# Patient Record
Sex: MALE | Race: WHITE | Employment: FULL TIME | URBAN - METROPOLITAN AREA
[De-identification: names, ages, dates, MRNs, and addresses within clinical notes are randomized per-mention and may not be internally consistent; named-entity substitution may affect disease eponyms.]

---

## 2018-09-29 ENCOUNTER — APPOINTMENT (OUTPATIENT)
Dept: GENERAL RADIOLOGY | Age: 33
End: 2018-09-29
Payer: COMMERCIAL

## 2018-09-29 ENCOUNTER — HOSPITAL ENCOUNTER (EMERGENCY)
Age: 33
Discharge: HOME | End: 2018-09-29
Attending: EMERGENCY MEDICINE
Payer: COMMERCIAL

## 2018-09-29 VITALS
OXYGEN SATURATION: 97 % | TEMPERATURE: 98.3 F | SYSTOLIC BLOOD PRESSURE: 121 MMHG | HEART RATE: 70 BPM | DIASTOLIC BLOOD PRESSURE: 74 MMHG | HEIGHT: 67 IN | BODY MASS INDEX: 27.78 KG/M2 | WEIGHT: 177 LBS | RESPIRATION RATE: 18 BRPM

## 2018-09-29 DIAGNOSIS — S93.401A SPRAIN OF RIGHT ANKLE, UNSPECIFIED LIGAMENT, INITIAL ENCOUNTER: Primary | ICD-10-CM

## 2018-09-29 PROCEDURE — 73610 X-RAY EXAM OF ANKLE: CPT

## 2018-09-29 PROCEDURE — 96372 THER/PROPH/DIAG INJ SC/IM: CPT

## 2018-09-29 PROCEDURE — 99283 EMERGENCY DEPT VISIT LOW MDM: CPT

## 2018-09-29 PROCEDURE — 6360000002 HC RX W HCPCS: Performed by: EMERGENCY MEDICINE

## 2018-09-29 RX ORDER — KETOROLAC TROMETHAMINE 10 MG/1
10 TABLET, FILM COATED ORAL EVERY 6 HOURS PRN
Qty: 20 TABLET | Refills: 0 | Status: SHIPPED | OUTPATIENT
Start: 2018-09-29 | End: 2022-07-18

## 2018-09-29 RX ORDER — KETOROLAC TROMETHAMINE 30 MG/ML
60 INJECTION, SOLUTION INTRAMUSCULAR; INTRAVENOUS ONCE
Status: COMPLETED | OUTPATIENT
Start: 2018-09-29 | End: 2018-09-29

## 2018-09-29 RX ADMIN — KETOROLAC TROMETHAMINE 60 MG: 30 INJECTION, SOLUTION INTRAMUSCULAR at 16:27

## 2018-09-29 ASSESSMENT — PAIN DESCRIPTION - DESCRIPTORS: DESCRIPTORS: THROBBING

## 2018-09-29 ASSESSMENT — PAIN SCALES - GENERAL: PAINLEVEL_OUTOF10: 5

## 2018-09-29 ASSESSMENT — ENCOUNTER SYMPTOMS
WHEEZING: 0
SINUS PRESSURE: 0
COUGH: 0
ABDOMINAL DISTENTION: 0
ABDOMINAL PAIN: 0
COLOR CHANGE: 0
SHORTNESS OF BREATH: 0
DIARRHEA: 0
VOMITING: 0
APNEA: 0
PHOTOPHOBIA: 0
BACK PAIN: 0
RHINORRHEA: 0
NAUSEA: 0
EYE PAIN: 0
CONSTIPATION: 0
SORE THROAT: 0

## 2018-09-29 ASSESSMENT — PAIN DESCRIPTION - ORIENTATION: ORIENTATION: RIGHT

## 2018-09-29 ASSESSMENT — PAIN DESCRIPTION - LOCATION: LOCATION: ANKLE

## 2022-07-18 ENCOUNTER — HOSPITAL ENCOUNTER (OUTPATIENT)
Dept: GENERAL RADIOLOGY | Age: 37
Discharge: HOME | End: 2022-07-20
Payer: COMMERCIAL

## 2022-07-18 ENCOUNTER — OFFICE VISIT (OUTPATIENT)
Dept: FAMILY MEDICINE CLINIC | Age: 37
End: 2022-07-18
Payer: COMMERCIAL

## 2022-07-18 ENCOUNTER — HOSPITAL ENCOUNTER (OUTPATIENT)
Age: 37
Discharge: HOME | End: 2022-07-20
Payer: COMMERCIAL

## 2022-07-18 VITALS
DIASTOLIC BLOOD PRESSURE: 76 MMHG | TEMPERATURE: 98.7 F | HEART RATE: 82 BPM | HEIGHT: 67 IN | SYSTOLIC BLOOD PRESSURE: 120 MMHG | OXYGEN SATURATION: 97 % | BODY MASS INDEX: 28.72 KG/M2 | WEIGHT: 183 LBS

## 2022-07-18 DIAGNOSIS — R60.9 EDEMA, UNSPECIFIED TYPE: ICD-10-CM

## 2022-07-18 DIAGNOSIS — R06.02 SHORTNESS OF BREATH: ICD-10-CM

## 2022-07-18 DIAGNOSIS — R05.9 COUGH: ICD-10-CM

## 2022-07-18 DIAGNOSIS — R06.02 SHORTNESS OF BREATH: Primary | ICD-10-CM

## 2022-07-18 PROCEDURE — 99215 OFFICE O/P EST HI 40 MIN: CPT

## 2022-07-18 PROCEDURE — 93000 ELECTROCARDIOGRAM COMPLETE: CPT

## 2022-07-18 PROCEDURE — 71046 X-RAY EXAM CHEST 2 VIEWS: CPT

## 2022-07-18 SDOH — ECONOMIC STABILITY: FOOD INSECURITY: WITHIN THE PAST 12 MONTHS, YOU WORRIED THAT YOUR FOOD WOULD RUN OUT BEFORE YOU GOT MONEY TO BUY MORE.: NEVER TRUE

## 2022-07-18 SDOH — ECONOMIC STABILITY: FOOD INSECURITY: WITHIN THE PAST 12 MONTHS, THE FOOD YOU BOUGHT JUST DIDN'T LAST AND YOU DIDN'T HAVE MONEY TO GET MORE.: NEVER TRUE

## 2022-07-18 ASSESSMENT — ENCOUNTER SYMPTOMS
RHINORRHEA: 0
CHEST TIGHTNESS: 0
WHEEZING: 0
SHORTNESS OF BREATH: 1
NAUSEA: 0
COUGH: 1
SINUS PAIN: 0
EYES NEGATIVE: 1
ABDOMINAL PAIN: 0
SORE THROAT: 0
DIARRHEA: 0
SINUS PRESSURE: 0
VOMITING: 0

## 2022-07-18 ASSESSMENT — PATIENT HEALTH QUESTIONNAIRE - PHQ9
1. LITTLE INTEREST OR PLEASURE IN DOING THINGS: 0
SUM OF ALL RESPONSES TO PHQ QUESTIONS 1-9: 0
SUM OF ALL RESPONSES TO PHQ QUESTIONS 1-9: 0
2. FEELING DOWN, DEPRESSED OR HOPELESS: 0
SUM OF ALL RESPONSES TO PHQ QUESTIONS 1-9: 0
SUM OF ALL RESPONSES TO PHQ9 QUESTIONS 1 & 2: 0
SUM OF ALL RESPONSES TO PHQ QUESTIONS 1-9: 0

## 2022-07-18 ASSESSMENT — SOCIAL DETERMINANTS OF HEALTH (SDOH): HOW HARD IS IT FOR YOU TO PAY FOR THE VERY BASICS LIKE FOOD, HOUSING, MEDICAL CARE, AND HEATING?: NOT HARD AT ALL

## 2022-07-18 NOTE — PROGRESS NOTES
sounds. No stridor. No wheezing, rhonchi or rales. Chest:      Chest wall: No tenderness. Abdominal:      General: Abdomen is flat. Musculoskeletal:      Cervical back: No rigidity or tenderness. Right lower le+ Edema present. Left lower le+ Edema present. Lymphadenopathy:      Cervical: No cervical adenopathy. Skin:     General: Skin is warm and dry. Capillary Refill: Capillary refill takes less than 2 seconds. Coloration: Skin is not pale. Neurological:      Mental Status: He is alert and oriented to person, place, and time. Mental status is at baseline. Psychiatric:         Mood and Affect: Mood normal.         Behavior: Behavior normal.       VITALS  BP: 120/76, Temp: 98.7 °F (37.1 °C), Temp Source: Temporal, Heart Rate: 82,  , SpO2: 97 %      PAST MEDICAL HISTORY         Diagnosis Date    Heart murmur      SURGICAL HISTORY     Patient  has a past surgical history that includes Dental surgery. CURRENT MEDICATIONS       Previous Medications    IBUPROFEN PO    Take by mouth     ALLERGIES     Patient is has No Known Allergies. FAMILY HISTORY     Patient'sfamily history is not on file. HISTORY     Patient  reports that he has never smoked. He has been exposed to tobacco smoke. He has never used smokeless tobacco. He reports current alcohol use. He reports that he does not use drugs. READY CARE COURSE     Orders Placed This Encounter   Procedures    XR CHEST STANDARD (2 VW)     Standing Status:   Future     Standing Expiration Date:   2023     Order Specific Question:   Reason for exam:     Answer:   Cough greater than 2 weeks, dependent edema nonpitting    Urinalysis with Reflex to Culture     Standing Status:   Future     Standing Expiration Date:   2023     Order Specific Question:   SPECIFY(EX-CATH,MIDSTREAM,CYSTO,ETC)?      Answer:   midstream    CBC with Auto Differential     Standing Status:   Future     Standing Expiration Date:   2023 Comprehensive Metabolic Panel     Standing Status:   Future     Standing Expiration Date:   7/18/2023    EKG 12 lead     Order Specific Question:   Reason for Exam?     Answer:   Shortness of Breath    EKG 12 Lead - Clinic Performed     Order Specific Question:   Reason for Exam?     Answer:   Irregular heart rate        Labs:  No results found for this visit on 07/18/22. IMAGING:  XR CHEST STANDARD (2 VW)    (Results Pending)     Scheduled Meds:  Continuous Infusions:  PRN Meds:.

## 2022-07-18 NOTE — PATIENT INSTRUCTIONS
Have blood work performed tonight along with urinalysis and chest x-ray. I will call you with results when they are available. Is important to make an appointment with a PCP to follow-up for the lower extremity edema.

## 2022-07-19 ENCOUNTER — HOSPITAL ENCOUNTER (OUTPATIENT)
Dept: LAB | Age: 37
Discharge: HOME | End: 2022-07-19
Payer: COMMERCIAL

## 2022-07-19 DIAGNOSIS — R06.02 SHORTNESS OF BREATH: ICD-10-CM

## 2022-07-19 DIAGNOSIS — R05.9 COUGH: ICD-10-CM

## 2022-07-19 DIAGNOSIS — R60.9 EDEMA, UNSPECIFIED TYPE: ICD-10-CM

## 2022-07-19 LAB
ALBUMIN SERPL-MCNC: 3.9 G/DL (ref 3.5–4.6)
ALP BLD-CCNC: 77 U/L (ref 35–104)
ALT SERPL-CCNC: 16 U/L (ref 0–41)
ANION GAP SERPL CALCULATED.3IONS-SCNC: 13 MEQ/L (ref 9–15)
AST SERPL-CCNC: 13 U/L (ref 0–40)
BASOPHILS ABSOLUTE: 0.1 K/UL (ref 0–0.2)
BASOPHILS RELATIVE PERCENT: 0.9 %
BILIRUB SERPL-MCNC: 0.5 MG/DL (ref 0.2–0.7)
BILIRUBIN URINE: NEGATIVE
BLOOD, URINE: NEGATIVE
BUN BLDV-MCNC: 16 MG/DL (ref 6–20)
CALCIUM SERPL-MCNC: 9.6 MG/DL (ref 8.5–9.9)
CHLORIDE BLD-SCNC: 98 MEQ/L (ref 95–107)
CLARITY: CLEAR
CO2: 26 MEQ/L (ref 20–31)
COLOR: YELLOW
CREAT SERPL-MCNC: 1.26 MG/DL (ref 0.7–1.2)
EOSINOPHILS ABSOLUTE: 0.2 K/UL (ref 0–0.7)
EOSINOPHILS RELATIVE PERCENT: 3.3 %
GFR AFRICAN AMERICAN: >60
GFR NON-AFRICAN AMERICAN: >60
GLOBULIN: 3.1 G/DL (ref 2.3–3.5)
GLUCOSE BLD-MCNC: 122 MG/DL (ref 70–99)
GLUCOSE URINE: NEGATIVE MG/DL
HCT VFR BLD CALC: 42.8 % (ref 42–52)
HEMOGLOBIN: 14.2 G/DL (ref 14–18)
KETONES, URINE: NEGATIVE MG/DL
LEUKOCYTE ESTERASE, URINE: NEGATIVE
LYMPHOCYTES ABSOLUTE: 1 K/UL (ref 1–4.8)
LYMPHOCYTES RELATIVE PERCENT: 15.9 %
MCH RBC QN AUTO: 27.6 PG (ref 27–31.3)
MCHC RBC AUTO-ENTMCNC: 33.2 % (ref 33–37)
MCV RBC AUTO: 83.1 FL (ref 80–100)
MONOCYTES ABSOLUTE: 0.5 K/UL (ref 0.2–0.8)
MONOCYTES RELATIVE PERCENT: 8.3 %
NEUTROPHILS ABSOLUTE: 4.3 K/UL (ref 1.4–6.5)
NEUTROPHILS RELATIVE PERCENT: 71.6 %
NITRITE, URINE: NEGATIVE
PDW BLD-RTO: 13.3 % (ref 11.5–14.5)
PH UA: 5.5 (ref 5–9)
PLATELET # BLD: 279 K/UL (ref 130–400)
POTASSIUM SERPL-SCNC: 4.2 MEQ/L (ref 3.4–4.9)
PROTEIN UA: NEGATIVE MG/DL
RBC # BLD: 5.15 M/UL (ref 4.7–6.1)
SODIUM BLD-SCNC: 137 MEQ/L (ref 135–144)
SPECIFIC GRAVITY UA: 1.02 (ref 1–1.03)
TOTAL PROTEIN: 7 G/DL (ref 6.3–8)
URINE REFLEX TO CULTURE: NORMAL
UROBILINOGEN, URINE: 0.2 E.U./DL
WBC # BLD: 6.1 K/UL (ref 4.8–10.8)

## 2022-07-19 PROCEDURE — 36415 COLL VENOUS BLD VENIPUNCTURE: CPT

## 2022-07-19 PROCEDURE — 80053 COMPREHEN METABOLIC PANEL: CPT

## 2022-07-19 PROCEDURE — 81003 URINALYSIS AUTO W/O SCOPE: CPT

## 2022-07-19 PROCEDURE — 85025 COMPLETE CBC W/AUTO DIFF WBC: CPT

## 2022-07-28 ENCOUNTER — OFFICE VISIT (OUTPATIENT)
Dept: FAMILY MEDICINE CLINIC | Age: 37
End: 2022-07-28
Payer: COMMERCIAL

## 2022-07-28 VITALS
HEART RATE: 81 BPM | WEIGHT: 178 LBS | HEIGHT: 67 IN | OXYGEN SATURATION: 98 % | SYSTOLIC BLOOD PRESSURE: 114 MMHG | BODY MASS INDEX: 27.94 KG/M2 | DIASTOLIC BLOOD PRESSURE: 78 MMHG | TEMPERATURE: 97.5 F

## 2022-07-28 DIAGNOSIS — R06.2 WHEEZING: ICD-10-CM

## 2022-07-28 DIAGNOSIS — R53.83 FATIGUE, UNSPECIFIED TYPE: ICD-10-CM

## 2022-07-28 DIAGNOSIS — N28.9 MILD RENAL INSUFFICIENCY: ICD-10-CM

## 2022-07-28 DIAGNOSIS — R73.9 HYPERGLYCEMIA: ICD-10-CM

## 2022-07-28 DIAGNOSIS — R05.9 COUGH: Primary | ICD-10-CM

## 2022-07-28 LAB
Lab: NORMAL
PERFORMING INSTRUMENT: NORMAL
QC PASS/FAIL: NORMAL
SARS-COV-2, POC: NORMAL

## 2022-07-28 PROCEDURE — 87426 SARSCOV CORONAVIRUS AG IA: CPT | Performed by: FAMILY MEDICINE

## 2022-07-28 PROCEDURE — 99204 OFFICE O/P NEW MOD 45 MIN: CPT | Performed by: FAMILY MEDICINE

## 2022-07-28 RX ORDER — ALBUTEROL SULFATE 90 UG/1
2 AEROSOL, METERED RESPIRATORY (INHALATION) EVERY 6 HOURS PRN
Qty: 18 G | Refills: 0 | Status: SHIPPED | OUTPATIENT
Start: 2022-07-28

## 2022-07-28 RX ORDER — ACETAMINOPHEN 500 MG
500 TABLET ORAL EVERY 6 HOURS PRN
COMMUNITY

## 2022-07-28 RX ORDER — PREDNISONE 10 MG/1
TABLET ORAL
Qty: 45 TABLET | Refills: 0 | Status: SHIPPED | OUTPATIENT
Start: 2022-07-28

## 2022-07-28 ASSESSMENT — ENCOUNTER SYMPTOMS
WHEEZING: 1
NAUSEA: 0
SORE THROAT: 0
SINUS PAIN: 0
RHINORRHEA: 0
CHEST TIGHTNESS: 1
COUGH: 1
VOMITING: 0
DIARRHEA: 0
ABDOMINAL PAIN: 0
SHORTNESS OF BREATH: 1
SINUS PRESSURE: 0

## 2022-07-28 ASSESSMENT — PATIENT HEALTH QUESTIONNAIRE - PHQ9
SUM OF ALL RESPONSES TO PHQ9 QUESTIONS 1 & 2: 0
SUM OF ALL RESPONSES TO PHQ QUESTIONS 1-9: 0
2. FEELING DOWN, DEPRESSED OR HOPELESS: 0
SUM OF ALL RESPONSES TO PHQ QUESTIONS 1-9: 0
1. LITTLE INTEREST OR PLEASURE IN DOING THINGS: 0
SUM OF ALL RESPONSES TO PHQ QUESTIONS 1-9: 0
SUM OF ALL RESPONSES TO PHQ QUESTIONS 1-9: 0

## 2022-07-28 NOTE — PROGRESS NOTES
Kevan Carr (: 1985) is a 40 y.o. male, New patient, who presents today for:    Chief Complaint   Patient presents with    New Patient     Patient is present to [facility] care. Cough     Patient states that he has had a bad cough and SOB Patient states that this has been going on for a few weeks          ASSESSMENT/PLAN    1. Cough  Comments:  Likely related to hyperreactive airway. Will treat empirically with a tapered course of prednisone and albuterol Q6H scheduled x3 days and Q6H PRN afterward. Orders:  -     POCT COVID-19, Antigen  -     predniSONE (DELTASONE) 10 MG tablet; Take 5 tabs daily x 3 days, 4 tabs daily x 3 days, 3 tabs daily x 3 days, 2 tabs daily x 3 days, 1 tab daily x 3 days, Disp-45 tablet, R-0Normal  -     albuterol sulfate HFA (PROVENTIL HFA) 108 (90 Base) MCG/ACT inhaler; Inhale 2 puffs into the lungs every 6 hours as needed for Wheezing or Shortness of Breath, Disp-18 g, R-0Normal  2. Wheezing  Comments:  See above. If symptoms persist, or improve and then worsen following treatment, the next step would be obtaining PFTs and referring to pulmonology. Orders:  -     predniSONE (DELTASONE) 10 MG tablet; Take 5 tabs daily x 3 days, 4 tabs daily x 3 days, 3 tabs daily x 3 days, 2 tabs daily x 3 days, 1 tab daily x 3 days, Disp-45 tablet, R-0Normal  -     albuterol sulfate HFA (PROVENTIL HFA) 108 (90 Base) MCG/ACT inhaler; Inhale 2 puffs into the lungs every 6 hours as needed for Wheezing or Shortness of Breath, Disp-18 g, R-0Normal  3. Fatigue, unspecified type  Comments:  Likely related to airway issues, will obtain labs to further evaluate. Orders:  -     Basic Metabolic Panel; Future  -     TSH with Reflex; Future  -     Vitamin D 25 Hydroxy; Future  4. Mild renal insufficiency  -     Basic Metabolic Panel; Future  5. Hyperglycemia  -     Basic Metabolic Panel; Future  -     Hemoglobin A1C; Future      Return for F/U wheezing and cough in 2-3 weeks. SUBJECTIVE/OBJECTIVE:    HPI    Patient presents to establish care and have walk-in clinic follow-up visit. Patient was seen at [facility] clinic on 07/18/2022 with complaints of nonproductive cough, lower extremity edema, dyspnea on exertion, and fatigue x2 weeks. Patient was noted to have 1+ bilateral ankle edema. An EKG was reported to show sinus rhythm with a rate of 85. Orders for CBC, CMP, chest x-ray and urinalysis were given. Patient was instructed to follow-up with primary care as an outpatient. Patient reports feeling improved overall, however, there is reported continued cough coming in fits that is non-productive. There is no dyspnea at rest, but mild dyspnea on exertion. There is reported intermittent wheezing with difficulty taking deep breaths. Patient denies any palpitations, lightheadedness, dizziness, or syncope. The previously reported lower extremity edema has for the most part resolved. Patient denies any cigarette use. He denies any history of seasonal allergies or past history of asthma. Current Outpatient Medications on File Prior to Visit   Medication Sig Dispense Refill    acetaminophen (TYLENOL) 500 MG tablet Take 500 mg by mouth every 6 hours as needed for Pain      IBUPROFEN PO Take by mouth       No current facility-administered medications on file prior to visit. No Known Allergies     Review of Systems   Constitutional:  Positive for fatigue. Negative for appetite change, chills, diaphoresis and fever. HENT:  Negative for congestion, ear pain, postnasal drip, rhinorrhea, sinus pressure, sinus pain and sore throat. No anosmia   Eyes:  Negative for visual disturbance. Respiratory:  Positive for cough, chest tightness, shortness of breath and wheezing. Cardiovascular:  Negative for chest pain, palpitations and leg swelling (resolved). Gastrointestinal:  Negative for abdominal pain, diarrhea, nausea and vomiting.    Musculoskeletal:  Negative for myalgias. Skin:  Negative for rash. Neurological:  Negative for dizziness, syncope, light-headedness and headaches. Hematological:  Negative for adenopathy. Vitals:  /78 (Site: Left Upper Arm, Position: Sitting, Cuff Size: Medium Adult)   Pulse 81   Temp 97.5 °F (36.4 °C) (Temporal)   Ht 5' 7\" (1.702 m)   Wt 178 lb (80.7 kg)   SpO2 98%   BMI 27.88 kg/m²      Internal Administration   First Dose      Second Dose           Last COVID Lab SARS-COV-2, POC (no units)   Date Value   07/28/2022 Not-Detected           Physical Exam  Vitals reviewed. Constitutional:       General: He is not in acute distress. Appearance: He is not ill-appearing or diaphoretic. HENT:      Head: Normocephalic and atraumatic. Salivary Glands: Right salivary gland is not diffusely enlarged or tender. Left salivary gland is not diffusely enlarged or tender. Right Ear: External ear normal.      Left Ear: External ear normal.      Nose: Nose normal. No congestion. Right Sinus: No maxillary sinus tenderness or frontal sinus tenderness. Left Sinus: No maxillary sinus tenderness or frontal sinus tenderness. Eyes:      General: No scleral icterus. Right eye: No discharge. Left eye: No discharge. Conjunctiva/sclera: Conjunctivae normal.      Pupils: Pupils are equal, round, and reactive to light. Neck:      Thyroid: No thyroid mass, thyromegaly or thyroid tenderness. Vascular: No carotid bruit. Cardiovascular:      Rate and Rhythm: Normal rate and regular rhythm. Heart sounds: Normal heart sounds. No murmur heard. Pulmonary:      Effort: Pulmonary effort is normal. No tachypnea, accessory muscle usage or respiratory distress. Breath sounds: No decreased air movement. Wheezing (scattered end-expiratory wheezing) present. No decreased breath sounds, rhonchi or rales. Abdominal:      General: Bowel sounds are normal. There is no distension.       Palpations: Abdomen is soft. Tenderness: There is no abdominal tenderness. There is no right CVA tenderness, left CVA tenderness, guarding or rebound. Musculoskeletal:      Cervical back: Neck supple. Right lower leg: No edema. Left lower leg: No edema. Lymphadenopathy:      Cervical: No cervical adenopathy. Skin:     Findings: No rash. Neurological:      Mental Status: He is alert and oriented to person, place, and time. Psychiatric:         Mood and Affect: Mood normal.         Behavior: Behavior normal.         Thought Content: Thought content normal.       Ortho Exam (If Applicable)              An electronic signature was used to authenticate this note.      Desmond Rasmussen MD

## 2022-08-09 ENCOUNTER — HOSPITAL ENCOUNTER (OUTPATIENT)
Dept: LAB | Age: 37
Discharge: HOME | End: 2022-08-09
Payer: COMMERCIAL

## 2022-08-09 DIAGNOSIS — N28.9 MILD RENAL INSUFFICIENCY: ICD-10-CM

## 2022-08-09 DIAGNOSIS — R53.83 FATIGUE, UNSPECIFIED TYPE: ICD-10-CM

## 2022-08-09 DIAGNOSIS — R73.9 HYPERGLYCEMIA: ICD-10-CM

## 2022-08-09 LAB
ANION GAP SERPL CALCULATED.3IONS-SCNC: 15 MEQ/L (ref 9–15)
BUN BLDV-MCNC: 18 MG/DL (ref 6–20)
CALCIUM SERPL-MCNC: 9.3 MG/DL (ref 8.5–9.9)
CHLORIDE BLD-SCNC: 97 MEQ/L (ref 95–107)
CO2: 26 MEQ/L (ref 20–31)
CREAT SERPL-MCNC: 1.05 MG/DL (ref 0.7–1.2)
GFR AFRICAN AMERICAN: >60
GFR NON-AFRICAN AMERICAN: >60
GLUCOSE BLD-MCNC: 129 MG/DL (ref 70–99)
HBA1C MFR BLD: 5.8 % (ref 4.8–5.9)
POTASSIUM SERPL-SCNC: 4.2 MEQ/L (ref 3.4–4.9)
SODIUM BLD-SCNC: 138 MEQ/L (ref 135–144)
TSH REFLEX: 0.89 UIU/ML (ref 0.44–3.86)
VITAMIN D 25-HYDROXY: 25.6 NG/ML

## 2022-08-09 PROCEDURE — 83036 HEMOGLOBIN GLYCOSYLATED A1C: CPT

## 2022-08-09 PROCEDURE — 80048 BASIC METABOLIC PNL TOTAL CA: CPT

## 2022-08-09 PROCEDURE — 82306 VITAMIN D 25 HYDROXY: CPT

## 2022-08-09 PROCEDURE — 36415 COLL VENOUS BLD VENIPUNCTURE: CPT

## 2022-08-09 PROCEDURE — 84443 ASSAY THYROID STIM HORMONE: CPT

## 2022-08-17 DIAGNOSIS — E55.9 VITAMIN D DEFICIENCY: Primary | ICD-10-CM

## 2022-08-17 DIAGNOSIS — R73.03 PRE-DIABETES: ICD-10-CM

## 2022-08-17 RX ORDER — ACETAMINOPHEN 160 MG
1 TABLET,DISINTEGRATING ORAL DAILY
Qty: 90 CAPSULE | Refills: 1 | Status: SHIPPED | OUTPATIENT
Start: 2022-08-17

## 2022-08-17 NOTE — RESULT ENCOUNTER NOTE
Low vitamin D 25.6, A1c 5.8, BMP with  normal TSH 0.889  MyChart message sent to patient, Rx sent to pharmacy

## 2022-08-19 ENCOUNTER — HOSPITAL ENCOUNTER (OUTPATIENT)
Age: 37
Discharge: HOME | End: 2022-08-21
Payer: COMMERCIAL

## 2022-08-19 ENCOUNTER — OFFICE VISIT (OUTPATIENT)
Dept: FAMILY MEDICINE CLINIC | Age: 37
End: 2022-08-19
Payer: COMMERCIAL

## 2022-08-19 ENCOUNTER — HOSPITAL ENCOUNTER (OUTPATIENT)
Dept: GENERAL RADIOLOGY | Age: 37
Discharge: HOME | End: 2022-08-21
Payer: COMMERCIAL

## 2022-08-19 VITALS
DIASTOLIC BLOOD PRESSURE: 80 MMHG | HEART RATE: 88 BPM | SYSTOLIC BLOOD PRESSURE: 108 MMHG | WEIGHT: 176.6 LBS | OXYGEN SATURATION: 97 % | TEMPERATURE: 97.4 F | HEIGHT: 67 IN | BODY MASS INDEX: 27.72 KG/M2

## 2022-08-19 DIAGNOSIS — R06.2 WHEEZING: ICD-10-CM

## 2022-08-19 DIAGNOSIS — R05.9 COUGH: Primary | ICD-10-CM

## 2022-08-19 DIAGNOSIS — R05.9 COUGH: ICD-10-CM

## 2022-08-19 PROCEDURE — 99213 OFFICE O/P EST LOW 20 MIN: CPT | Performed by: FAMILY MEDICINE

## 2022-08-19 PROCEDURE — 71046 X-RAY EXAM CHEST 2 VIEWS: CPT

## 2022-08-19 ASSESSMENT — PATIENT HEALTH QUESTIONNAIRE - PHQ9
SUM OF ALL RESPONSES TO PHQ9 QUESTIONS 1 & 2: 0
SUM OF ALL RESPONSES TO PHQ QUESTIONS 1-9: 0
2. FEELING DOWN, DEPRESSED OR HOPELESS: 0
1. LITTLE INTEREST OR PLEASURE IN DOING THINGS: 0

## 2022-08-19 ASSESSMENT — ENCOUNTER SYMPTOMS
DIARRHEA: 0
NAUSEA: 0
VOMITING: 0
ABDOMINAL PAIN: 0
WHEEZING: 1
SHORTNESS OF BREATH: 0
COUGH: 1
CHEST TIGHTNESS: 1

## 2022-08-19 NOTE — PROGRESS NOTES
Stephen Clement (: 1985) is a 40 y.o. male, Established patient, who presents today for:    Chief Complaint   Patient presents with    Follow-up     Patient is present for f/u from visit on 2022. Patient states that he is still having the cough and wheezing since he finished the prednisone. ASSESSMENT/PLAN    1. Cough  Comments:  Continued despite recent course of prednisone. Will obtain CXR, PFTs, and refer to pulm. Patient to continue PRN albuterol  Orders:  -     XR CHEST STANDARD (2 VW); Future  -     Full PFT Study With Bronchodilator; Future  -     Baron Mckeon MD, PulmonologyArnaud  2. Wheezing  Comments:  Managed with PRN albuterol. See above. Orders:  -     XR CHEST STANDARD (2 VW); Future  -     Full PFT Study With Bronchodilator; Future  -     Baron Mckeon MD, PulmonologyJeffrey    Return in about 4 months (around 2022) for Chronic Disease Check. SUBJECTIVE/OBJECTIVE:    HPI    Patient presents for follow-up visit regarding cough and wheezing. They report finishing full tapered course of prednisone since most recent visit. Reported persistent cough and reported intermittent wheezing despite finishing full course of medication for treatment. Cough and wheezing were improved with use of prednisone, but worsened again following treatment. Cough is non-productive, but intermittent throughout the day, particularly with activity/exercise and is noted to worse early in AM and later at night. There is no reported dyspnea or chest pain. The wheezing is associated with chest tightness that is improved with use of albuterol. Patient denies any fever/chills/sweats, nasal congestion, postnasal drainage, sinus pain, ear pain, sore throat, headache, abdominal pain, nausea/vomiting, or diarrhea.     Current Outpatient Medications on File Prior to Visit   Medication Sig Dispense Refill    Cholecalciferol (VITAMIN D3) 50 MCG ( UT) CAPS Take 1 capsule by mouth daily 90 capsule 1    acetaminophen (TYLENOL) 500 MG tablet Take 500 mg by mouth every 6 hours as needed for Pain      predniSONE (DELTASONE) 10 MG tablet Take 5 tabs daily x 3 days, 4 tabs daily x 3 days, 3 tabs daily x 3 days, 2 tabs daily x 3 days, 1 tab daily x 3 days 45 tablet 0    albuterol sulfate HFA (PROVENTIL HFA) 108 (90 Base) MCG/ACT inhaler Inhale 2 puffs into the lungs every 6 hours as needed for Wheezing or Shortness of Breath 18 g 0    IBUPROFEN PO Take by mouth       No current facility-administered medications on file prior to visit. No Known Allergies     Review of Systems   Constitutional:  Negative for appetite change, chills, diaphoresis, fatigue and fever. Respiratory:  Positive for cough, chest tightness and wheezing. Negative for shortness of breath. Cardiovascular:  Negative for chest pain, palpitations and leg swelling. Gastrointestinal:  Negative for abdominal pain, diarrhea, nausea and vomiting. Neurological:  Negative for dizziness, syncope and light-headedness. Vitals:  /80 (Site: Left Upper Arm, Position: Sitting, Cuff Size: Medium Adult)   Pulse 88   Temp 97.4 °F (36.3 °C) (Temporal)   Ht 5' 7\" (1.702 m)   Wt 176 lb 9.6 oz (80.1 kg)   SpO2 97%   BMI 27.66 kg/m²     Physical Exam  Vitals reviewed. Constitutional:       General: He is not in acute distress. Appearance: Normal appearance. Cardiovascular:      Rate and Rhythm: Normal rate and regular rhythm. Heart sounds: No murmur heard. Pulmonary:      Effort: Pulmonary effort is normal. No tachypnea, accessory muscle usage or respiratory distress. Breath sounds: Normal breath sounds. No decreased air movement. No decreased breath sounds, wheezing, rhonchi or rales. Abdominal:      General: Bowel sounds are normal.      Palpations: Abdomen is soft. Tenderness: There is no abdominal tenderness. There is no guarding or rebound. Musculoskeletal:      Right lower leg: No edema. Left lower leg: No edema. Skin:     Findings: No rash. Neurological:      Mental Status: He is alert and oriented to person, place, and time. Psychiatric:         Mood and Affect: Mood normal.         Behavior: Behavior normal.         Thought Content: Thought content normal.       Ortho Exam (If Applicable)              An electronic signature was used to authenticate this note.      Anita Gottron, MD

## 2022-08-25 ENCOUNTER — HOSPITAL ENCOUNTER (OUTPATIENT)
Dept: PULMONOLOGY | Age: 37
Discharge: HOME | End: 2022-08-25
Payer: COMMERCIAL

## 2022-08-25 DIAGNOSIS — R06.2 WHEEZING: ICD-10-CM

## 2022-08-25 DIAGNOSIS — R05.9 COUGH: ICD-10-CM

## 2022-08-25 PROCEDURE — 6360000002 HC RX W HCPCS: Performed by: FAMILY MEDICINE

## 2022-08-25 PROCEDURE — 94729 DIFFUSING CAPACITY: CPT

## 2022-08-25 PROCEDURE — 94726 PLETHYSMOGRAPHY LUNG VOLUMES: CPT

## 2022-08-25 PROCEDURE — 94060 EVALUATION OF WHEEZING: CPT

## 2022-08-25 RX ORDER — ALBUTEROL SULFATE 2.5 MG/3ML
2.5 SOLUTION RESPIRATORY (INHALATION) ONCE
Status: COMPLETED | OUTPATIENT
Start: 2022-08-25 | End: 2022-08-25

## 2022-08-25 RX ADMIN — ALBUTEROL SULFATE 2.5 MG: 2.5 SOLUTION RESPIRATORY (INHALATION) at 08:26

## 2022-08-26 ENCOUNTER — OFFICE VISIT (OUTPATIENT)
Dept: PULMONOLOGY | Age: 37
End: 2022-08-26
Payer: COMMERCIAL

## 2022-08-26 VITALS
TEMPERATURE: 97.6 F | SYSTOLIC BLOOD PRESSURE: 111 MMHG | OXYGEN SATURATION: 98 % | HEIGHT: 67 IN | DIASTOLIC BLOOD PRESSURE: 66 MMHG | WEIGHT: 176 LBS | HEART RATE: 77 BPM | BODY MASS INDEX: 27.62 KG/M2

## 2022-08-26 DIAGNOSIS — R05.9 COUGH: Primary | ICD-10-CM

## 2022-08-26 PROCEDURE — 99204 OFFICE O/P NEW MOD 45 MIN: CPT | Performed by: INTERNAL MEDICINE

## 2022-08-26 RX ORDER — PANTOPRAZOLE SODIUM 40 MG/1
40 TABLET, DELAYED RELEASE ORAL
Qty: 30 TABLET | Refills: 0 | Status: SHIPPED | OUTPATIENT
Start: 2022-08-26 | End: 2022-09-23 | Stop reason: SDUPTHER

## 2022-08-26 RX ORDER — BENZONATATE 200 MG/1
200 CAPSULE ORAL 3 TIMES DAILY PRN
Qty: 60 CAPSULE | Refills: 0 | Status: SHIPPED | OUTPATIENT
Start: 2022-08-26 | End: 2022-09-15

## 2022-08-26 ASSESSMENT — ENCOUNTER SYMPTOMS
DIARRHEA: 0
EYE ITCHING: 0
NAUSEA: 0
ABDOMINAL PAIN: 0
SHORTNESS OF BREATH: 1
CHEST TIGHTNESS: 0
VOMITING: 0
VOICE CHANGE: 0
COUGH: 1
SORE THROAT: 0
WHEEZING: 1
RHINORRHEA: 0

## 2022-08-26 NOTE — PROGRESS NOTES
Subjective:     Jeannine Hurley is a 40 y.o. male who complains today of:     Chief Complaint   Patient presents with    New Patient     Ref. - Diana, Cough/Wheezing       HPI     He had covid  jan 2022. He is having cough for last 6 weeks. He said he said he was seen by dr. Dion Gordon. He continues to have a cough without significant improvement. He said he wakes up with cough and it is dry. He said he has no hearburn but he has cough when he is full after eating. He was given prednisone for 2 weeks it helped but but never went away. C/o shortness of breath  heavy exertion and fast walk. Off and on  Wheezing. 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 No Hemoptysis. No Chest tightness unless he cough a lot . No Chest pain with radiation or pleuritic pain. C/o left ankle edema few month   No orthopnea. No Fever or chills. No Rhinorrhea and postnasal drip. He is using bronchodilator with albuterol prn     CXR no active disease      Allergies:  Patient has no known allergies. Past Medical History:   Diagnosis Date    Heart murmur      Past Surgical History:   Procedure Laterality Date    DENTAL SURGERY       History reviewed. No pertinent family history.   Social History     Socioeconomic History    Marital status:      Spouse name: Not on file    Number of children: Not on file    Years of education: Not on file    Highest education level: Not on file   Occupational History    Not on file   Tobacco Use    Smoking status: Never     Passive exposure: Current    Smokeless tobacco: Never    Tobacco comments:     Wife smokes   Vaping Use    Vaping Use: Never used   Substance and Sexual Activity    Alcohol use: Yes     Comment: occasional    Drug use: No    Sexual activity: Yes     Partners: Female   Other Topics Concern    Not on file   Social History Narrative    Not on file     Social Determinants of Health     Financial Resource Strain: Low Risk Difficulty of Paying Living Expenses: Not hard at all   Food Insecurity: No Food Insecurity    Worried About Running Out of Food in the Last Year: Never true    Ran Out of Food in the Last Year: Never true   Transportation Needs: Not on file   Physical Activity: Not on file   Stress: Not on file   Social Connections: Not on file   Intimate Partner Violence: Not on file   Housing Stability: Not on file         Review of Systems   Constitutional:  Negative for chills, diaphoresis, fatigue and fever. HENT:  Negative for congestion, mouth sores, nosebleeds, postnasal drip, rhinorrhea, sneezing, sore throat and voice change. Eyes:  Negative for itching and visual disturbance. Respiratory:  Positive for cough, shortness of breath and wheezing. Negative for chest tightness. Cardiovascular: Negative. Negative for chest pain, palpitations and leg swelling. Gastrointestinal:  Negative for abdominal pain, diarrhea, nausea and vomiting. Genitourinary:  Negative for difficulty urinating and hematuria. Musculoskeletal:  Negative for arthralgias, joint swelling and myalgias. Skin:  Negative for rash. Allergic/Immunologic: Negative for environmental allergies. Neurological:  Negative for dizziness, tremors, weakness and headaches. Psychiatric/Behavioral:  Negative for behavioral problems and sleep disturbance.        :     Vitals:    08/26/22 1022   BP: 111/66   Site: Right Upper Arm   Position: Sitting   Cuff Size: Medium Adult   Pulse: 77   Temp: 97.6 °F (36.4 °C)   TempSrc: Temporal   SpO2: 98%   Weight: 176 lb (79.8 kg)   Height: 5' 7\" (1.702 m)     Wt Readings from Last 3 Encounters:   08/26/22 176 lb (79.8 kg)   08/19/22 176 lb 9.6 oz (80.1 kg)   07/28/22 178 lb (80.7 kg)         Physical Exam  Constitutional:       Appearance: He is well-developed. HENT:      Head: Normocephalic and atraumatic.       Nose: Nose normal.   Eyes:      Conjunctiva/sclera: Conjunctivae normal.      Pupils: Pupils are equal, round, and reactive to light. Neck:      Thyroid: No thyromegaly. Vascular: No JVD. Trachea: No tracheal deviation. Cardiovascular:      Rate and Rhythm: Normal rate and regular rhythm. Heart sounds: No murmur heard. No friction rub. No gallop. Pulmonary:      Effort: Pulmonary effort is normal. No respiratory distress. Breath sounds: Normal breath sounds. No wheezing or rales. Chest:      Chest wall: No tenderness. Abdominal:      General: There is no distension. Musculoskeletal:         General: Normal range of motion. Comments: Left ankle swelling   Lymphadenopathy:      Cervical: No cervical adenopathy. Skin:     General: Skin is warm and dry. Findings: No rash. Neurological:      Mental Status: He is alert and oriented to person, place, and time. Cranial Nerves: No cranial nerve deficit.    Psychiatric:         Behavior: Behavior normal.       Current Outpatient Medications   Medication Sig Dispense Refill    benzonatate (TESSALON) 200 MG capsule Take 1 capsule by mouth 3 times daily as needed for Cough 60 capsule 0    pantoprazole (PROTONIX) 40 MG tablet Take 1 tablet by mouth every morning (before breakfast) 30 tablet 0    mometasone-formoterol (DULERA) 100-5 MCG/ACT inhaler Inhale 2 puffs into the lungs 2 times daily 1 each 0    Cholecalciferol (VITAMIN D3) 50 MCG (2000 UT) CAPS Take 1 capsule by mouth daily 90 capsule 1    acetaminophen (TYLENOL) 500 MG tablet Take 500 mg by mouth every 6 hours as needed for Pain      albuterol sulfate HFA (PROVENTIL HFA) 108 (90 Base) MCG/ACT inhaler Inhale 2 puffs into the lungs every 6 hours as needed for Wheezing or Shortness of Breath 18 g 0    predniSONE (DELTASONE) 10 MG tablet Take 5 tabs daily x 3 days, 4 tabs daily x 3 days, 3 tabs daily x 3 days, 2 tabs daily x 3 days, 1 tab daily x 3 days (Patient not taking: Reported on 8/26/2022) 45 tablet 0    IBUPROFEN PO Take by mouth (Patient not taking: Reported on 8/26/2022)       No current facility-administered medications for this visit. Results for orders placed during the hospital encounter of 08/19/22    XR CHEST (2 VW)    Narrative  Exam: XR CHEST (2 VW)    CLINICAL HISTORY: R05.9 Cough ICD10    COMPARISON: None    CHEST X-ray, 2 VIEWS    FINDINGS:    The cardiomediastinal silhouette is within normal limits. There are no infiltrates, consolidations or effusions. .  Bones of the thorax appear intact. Impression  No radiographic evidence of acute intrathoracic process. Results for orders placed during the hospital encounter of 07/18/22    XR CHEST (2 VW)    Narrative  DIAGNOSIS:R06.02 Shortness of breath ICD10    COMPARISON: March 9, 2015    TECHNIQUE: PA and lateral chest    FINDINGS: The lungs contain no focal infiltrate, pleural effusion, consolidation or pneumothorax. The cardiac silhouette is not enlarged. The osseous structures are grossly intact. Impression  No acute cardiopulmonary process      Results for orders placed during the hospital encounter of 03/09/15    XR Chest Standard TWO VW    Narrative  EXAMINATION CHEST, TWO VIEWS    CLINICAL INFORMATION COUGH. FINDINGS Cardiomediastinal silhouette and pulmonary vascularity are  normal. There are no definite infiltrates or effusions. There is no  evidence of adenopathy. IMPRESSION    NEGATIVE CHEST. Brooklyn Dowling M.D. Released By- Lizzy Park M.D. Released Date Time- 03/10/15 1733  This document has been electronically signed. ------------------------------------------------------------------------------    Assessment/Plan:     1. Cough  He had covid  jan 2022. h cough started about 6 weeks ago. He is having cough for last 6 weeks. He said he wakes up with cough and it is dry. He said he has no hearburn but he has coughing spell when he is full after eating ability of GERD.   He was given prednisone for 2 weeks it helped but but never went away possibility of GERD, asked give him a trial of Protonix 40 mg daily for few weeks. .  Considering c/o shortness of breath  heavy exertion and fast walk. Off and on  Wheezing. 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 Chest x-ray showed no active disease. Started on Dulera 100 -5 mcg 2 puff twice daily. He is using bronchodilator with albuterol prn. PFT on 8/25/2022 not showing obstructive disease.    - benzonatate (TESSALON) 200 MG capsule; Take 1 capsule by mouth 3 times daily as needed for Cough  Dispense: 60 capsule; Refill: 0  - pantoprazole (PROTONIX) 40 MG tablet; Take 1 tablet by mouth every morning (before breakfast)  Dispense: 30 tablet; Refill: 0  - mometasone-formoterol (DULERA) 100-5 MCG/ACT inhaler; Inhale 2 puffs into the lungs 2 times daily  Dispense: 1 each; Refill: 0      Return in about 4 weeks (around 9/23/2022) for cough.       Amara Hood MD

## 2022-08-27 NOTE — PROCEDURES
[facility] [address] [place] [address], [address] [facility]                               PULMONARY FUNCTION    PATIENT NAME: Devorah Paulson                        :        1985  MED REC NO:   [id number]                            ROOM:  ACCOUNT NO:   [de-identified]                           ADMIT DATE: 2022  PROVIDER:     Charli Espinoza MD    DATE OF PROCEDURE:  2022    REQUESTING PROVIDER:  Chelsea Iqbal. Dwight Grullon MD.    INTERPRETING PROVIDER:  Arash Gayle MD.    REASON FOR STUDY:  Shortness of breath, cough, and wheezing. INTERPRETATION:  FVC is 3.63, 74% of predicted; FEV1 is 3.00, 75% of  predicted; FEV1/FVC is 82%; and FEF 25-75% is 3.17, 81% of predicted. Postbronchodilator study shows no significant improvement. Lung volume  study shows residual volume is 1.47, 92% of predicted. TLC is 5.18, 82%  of predicted. RV to TLC ratio is 28.3, 712% of predicted. Diffusion  capacity is 22.61, 69% of predicted. Corrected for alveolar volume is  5.23, 101% of predicted. Airway resistance is 1.32, 91% of predicted. SUMMARY:  FVC/FEV1 mildly decreased with normal ratio and midflow  suggest there is possibility of mild restriction, but total lung  capacity is within normal range. Slow vital capacity is mildly  decreased. There is no significant response to bronchodilator. Diffusion capacity is mildly impaired, which is normal when corrected to  alveolar volume. Airway resistance is within normal range. Clinical  correlation is requested.         Stan Suarez MD    D: 2022 9:40:12       T: 2022 10:56:31     [id number]  Job#: [id number]     Doc#: [id number]    CC:

## 2022-08-28 PROBLEM — R05.9 COUGH: Status: ACTIVE | Noted: 2022-08-28

## 2022-08-28 PROCEDURE — 94729 DIFFUSING CAPACITY: CPT | Performed by: INTERNAL MEDICINE

## 2022-08-28 PROCEDURE — 94060 EVALUATION OF WHEEZING: CPT | Performed by: INTERNAL MEDICINE

## 2022-08-28 PROCEDURE — 94726 PLETHYSMOGRAPHY LUNG VOLUMES: CPT | Performed by: INTERNAL MEDICINE

## 2022-08-29 DIAGNOSIS — R94.2 RESTRICTIVE VENTILATORY DEFECT: Primary | ICD-10-CM

## 2022-08-29 DIAGNOSIS — R05.9 COUGH: ICD-10-CM

## 2022-08-29 DIAGNOSIS — R06.2 WHEEZING: ICD-10-CM

## 2022-08-29 NOTE — RESULT ENCOUNTER NOTE
Please notify patient that recent pulmonary function testing shows possibility of mild restrictive lung defect. I would like to refer him to pulmonology to discuss any further potential management. Referral left in chart, please help patient schedule a visit.

## 2022-09-23 ENCOUNTER — OFFICE VISIT (OUTPATIENT)
Dept: PULMONOLOGY | Age: 37
End: 2022-09-23
Payer: COMMERCIAL

## 2022-09-23 VITALS
WEIGHT: 180 LBS | SYSTOLIC BLOOD PRESSURE: 90 MMHG | HEART RATE: 79 BPM | BODY MASS INDEX: 28.19 KG/M2 | DIASTOLIC BLOOD PRESSURE: 56 MMHG | OXYGEN SATURATION: 99 %

## 2022-09-23 DIAGNOSIS — R05.9 COUGH: ICD-10-CM

## 2022-09-23 DIAGNOSIS — K21.9 GASTROESOPHAGEAL REFLUX DISEASE WITHOUT ESOPHAGITIS: Primary | ICD-10-CM

## 2022-09-23 DIAGNOSIS — J45.20 MILD INTERMITTENT REACTIVE AIRWAY DISEASE WITHOUT COMPLICATION: ICD-10-CM

## 2022-09-23 PROCEDURE — 99214 OFFICE O/P EST MOD 30 MIN: CPT | Performed by: INTERNAL MEDICINE

## 2022-09-23 RX ORDER — PANTOPRAZOLE SODIUM 40 MG/1
40 TABLET, DELAYED RELEASE ORAL
Qty: 30 TABLET | Refills: 3 | Status: SHIPPED | OUTPATIENT
Start: 2022-09-23

## 2022-09-23 ASSESSMENT — ENCOUNTER SYMPTOMS
VOICE CHANGE: 0
DIARRHEA: 0
COUGH: 1
SORE THROAT: 0
SHORTNESS OF BREATH: 1
EYE ITCHING: 0
CHEST TIGHTNESS: 0
NAUSEA: 0
WHEEZING: 0
RHINORRHEA: 0
VOMITING: 0
ABDOMINAL PAIN: 0

## 2022-09-23 NOTE — PROGRESS NOTES
Subjective:     Sobeida Saab is a 40 y.o. male who complains today of:     Chief Complaint   Patient presents with    Follow-up     4 wk f/u for cough/wheezing       HPI    He said cough got much better , rarely cough any more,. He is still on dulera 100-5 2 puff BID , he is not using albuterol HFA   protonix. Tessalon pear prn . He said he has no hearburn but he has cough when he is full after eating. C/o shortness of breath  heavy exertion and fast walk. No Wheezing since he is on inhaler . He said he work in [employer] company , mainly [employer] and some [employer] work. No Hemoptysis. No Chest tightness unless he cough a lot . No Chest pain with radiation or pleuritic pain. C/o left ankle edema few month   No orthopnea. No Fever or chills. No Rhinorrhea and postnasal drip. Allergies:  Patient has no known allergies. Past Medical History:   Diagnosis Date    Heart murmur      Past Surgical History:   Procedure Laterality Date    DENTAL SURGERY       No family history on file.   Social History     Socioeconomic History    Marital status:      Spouse name: Not on file    Number of children: Not on file    Years of education: Not on file    Highest education level: Not on file   Occupational History    Not on file   Tobacco Use    Smoking status: Never     Passive exposure: Current    Smokeless tobacco: Never    Tobacco comments:     Wife smokes   Vaping Use    Vaping Use: Never used   Substance and Sexual Activity    Alcohol use: Yes     Comment: occasional    Drug use: No    Sexual activity: Yes     Partners: Female   Other Topics Concern    Not on file   Social History Narrative    Not on file     Social Determinants of Health     Financial Resource Strain: Low Risk     Difficulty of Paying Living Expenses: Not hard at all   Food Insecurity: No Food Insecurity    Worried About [address] in the Last Year: Never true    [facility] in the Last Year: Never true Transportation Needs: Not on file   Physical Activity: Not on file   Stress: Not on file   Social Connections: Not on file   Intimate Partner Violence: Not on file   Housing Stability: Not on file         Review of Systems   Constitutional:  Negative for chills, diaphoresis, fatigue and fever. HENT:  Negative for congestion, mouth sores, nosebleeds, postnasal drip, rhinorrhea, sneezing, sore throat and voice change. Eyes:  Negative for itching and visual disturbance. Respiratory:  Positive for cough and shortness of breath. Negative for chest tightness and wheezing. Cardiovascular: Negative. Negative for chest pain, palpitations and leg swelling. Gastrointestinal:  Negative for abdominal pain, diarrhea, nausea and vomiting. Genitourinary:  Negative for difficulty urinating and hematuria. Musculoskeletal:  Negative for arthralgias, joint swelling and myalgias. Skin:  Negative for rash. Allergic/Immunologic: Negative for environmental allergies. Neurological:  Negative for dizziness, tremors, weakness and headaches. Psychiatric/Behavioral:  Negative for behavioral problems and sleep disturbance.        :     Vitals:    09/23/22 0947   BP: (!) 90/56   Site: Left Lower Arm   Position: Sitting   Cuff Size: Medium Adult   Pulse: 79   SpO2: 99%   Weight: 180 lb (81.6 kg)     Wt Readings from Last 3 Encounters:   09/23/22 180 lb (81.6 kg)   08/26/22 176 lb (79.8 kg)   08/19/22 176 lb 9.6 oz (80.1 kg)         Physical Exam  Constitutional:       Appearance: He is well-developed. HENT:      Head: Normocephalic and atraumatic. Nose: Nose normal.   Eyes:      Conjunctiva/sclera: Conjunctivae normal.      Pupils: Pupils are equal, round, and reactive to light. Neck:      Thyroid: No thyromegaly. Vascular: No JVD. Trachea: No tracheal deviation. Cardiovascular:      Rate and Rhythm: Normal rate and regular rhythm. Heart sounds: No murmur heard. No friction rub. No gallop. Pulmonary:      Effort: Pulmonary effort is normal. No respiratory distress. Breath sounds: Normal breath sounds. No wheezing or rales. Chest:      Chest wall: No tenderness. Abdominal:      General: There is no distension. Musculoskeletal:         General: Normal range of motion. Lymphadenopathy:      Cervical: No cervical adenopathy. Skin:     General: Skin is warm and dry. Findings: No rash. Neurological:      Mental Status: He is alert and oriented to person, place, and time. Cranial Nerves: No cranial nerve deficit. Psychiatric:         Behavior: Behavior normal.       Current Outpatient Medications   Medication Sig Dispense Refill    mometasone-formoterol (DULERA) 100-5 MCG/ACT inhaler Inhale 2 puffs into the lungs 2 times daily 1 each 3    pantoprazole (PROTONIX) 40 MG tablet Take 1 tablet by mouth every morning (before breakfast) 30 tablet 3    Cholecalciferol (VITAMIN D3) 50 MCG (2000 UT) CAPS Take 1 capsule by mouth daily 90 capsule 1    acetaminophen (TYLENOL) 500 MG tablet Take 500 mg by mouth every 6 hours as needed for Pain      predniSONE (DELTASONE) 10 MG tablet Take 5 tabs daily x 3 days, 4 tabs daily x 3 days, 3 tabs daily x 3 days, 2 tabs daily x 3 days, 1 tab daily x 3 days 45 tablet 0    albuterol sulfate HFA (PROVENTIL HFA) 108 (90 Base) MCG/ACT inhaler Inhale 2 puffs into the lungs every 6 hours as needed for Wheezing or Shortness of Breath 18 g 0    IBUPROFEN PO Take by mouth       No current facility-administered medications for this visit. Results for orders placed during the hospital encounter of 08/19/22    XR CHEST (2 VW)    Narrative  Exam: XR CHEST (2 VW)    CLINICAL HISTORY: R05.9 Cough ICD10    COMPARISON: None    CHEST X-ray, 2 VIEWS    FINDINGS:    The cardiomediastinal silhouette is within normal limits. There are no infiltrates, consolidations or effusions. .  Bones of the thorax appear intact.     Impression  No radiographic evidence of acute intrathoracic process. Results for orders placed during the hospital encounter of 07/18/22    XR CHEST (2 VW)    Narrative  DIAGNOSIS:R06.02 Shortness of breath ICD10    COMPARISON: March 9, 2015    TECHNIQUE: PA and lateral chest    FINDINGS: The lungs contain no focal infiltrate, pleural effusion, consolidation or pneumothorax. The cardiac silhouette is not enlarged. The osseous structures are grossly intact. Impression  No acute cardiopulmonary process      Results for orders placed during the hospital encounter of 03/09/15    XR Chest Standard TWO VW    Narrative  EXAMINATION CHEST, TWO VIEWS    CLINICAL INFORMATION COUGH. FINDINGS Cardiomediastinal silhouette and pulmonary vascularity are  normal. There are no definite infiltrates or effusions. There is no  evidence of adenopathy. IMPRESSION    NEGATIVE CHEST. Everton Diaz M.D. Released By- Elizabeth Devine M.D. Released Date Time- 03/10/15 1733  This document has been electronically signed. ------------------------------------------------------------------------------      Assessment/Plan:     1. Cough  He said cough got much better , rarely cough any more,He is on dulera 100-5 2 puff BID , he is not using albuterol HFA   protonix. Tessalon pear prn . He said he has no hearburn but he has cough when he is full after eating. C/o shortness of breath  heavy exertion and fast walk. No Wheezing since he is on inhaler . 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 Cough has significantly improved. Continue same    2. Gastroesophageal reflux disease without esophagitis  - pantoprazole (PROTONIX) 40 MG tablet; Take 1 tablet by mouth every morning (before breakfast)  Dispense: 30 tablet; Refill: 3    3. Mild intermittent reactive airway disease without complication  He is on dulera 100-5 2 puff BID , he is not using albuterol HFA.   Continue bronchodilator therapy as before  - mometasone-formoterol (DULERA) 100-5 MCG/ACT inhaler; Inhale 2 puffs into the lungs 2 times daily  Dispense: 1 each; Refill: 3    Return in about 4 months (around 1/23/2023), or gerd, for cough.       Annie June MD

## 2022-09-27 PROBLEM — R05.9 COUGH: Status: RESOLVED | Noted: 2022-08-28 | Resolved: 2022-09-27

## 2022-12-15 ENCOUNTER — OFFICE VISIT (OUTPATIENT)
Dept: FAMILY MEDICINE CLINIC | Age: 37
End: 2022-12-15
Payer: COMMERCIAL

## 2022-12-15 VITALS
WEIGHT: 182.4 LBS | TEMPERATURE: 98.2 F | HEART RATE: 92 BPM | OXYGEN SATURATION: 99 % | SYSTOLIC BLOOD PRESSURE: 112 MMHG | HEIGHT: 67 IN | DIASTOLIC BLOOD PRESSURE: 62 MMHG | BODY MASS INDEX: 28.63 KG/M2

## 2022-12-15 DIAGNOSIS — Z13.220 SCREENING CHOLESTEROL LEVEL: ICD-10-CM

## 2022-12-15 DIAGNOSIS — Z11.59 NEED FOR HEPATITIS C SCREENING TEST: ICD-10-CM

## 2022-12-15 DIAGNOSIS — R73.03 PRE-DIABETES: ICD-10-CM

## 2022-12-15 DIAGNOSIS — E55.9 VITAMIN D DEFICIENCY: ICD-10-CM

## 2022-12-15 DIAGNOSIS — Z23 NEED FOR VACCINATION: ICD-10-CM

## 2022-12-15 DIAGNOSIS — J45.20 MILD INTERMITTENT REACTIVE AIRWAY DISEASE: Primary | ICD-10-CM

## 2022-12-15 PROBLEM — K21.9 GASTROESOPHAGEAL REFLUX DISEASE WITHOUT ESOPHAGITIS: Status: ACTIVE | Noted: 2022-12-15

## 2022-12-15 PROCEDURE — 90715 TDAP VACCINE 7 YRS/> IM: CPT | Performed by: FAMILY MEDICINE

## 2022-12-15 PROCEDURE — 90471 IMMUNIZATION ADMIN: CPT | Performed by: FAMILY MEDICINE

## 2022-12-15 PROCEDURE — 99214 OFFICE O/P EST MOD 30 MIN: CPT | Performed by: FAMILY MEDICINE

## 2022-12-15 ASSESSMENT — ENCOUNTER SYMPTOMS
COUGH: 0
ANAL BLEEDING: 0
DIARRHEA: 0
BLOOD IN STOOL: 0
VOMITING: 0
CONSTIPATION: 0
CHEST TIGHTNESS: 0
NAUSEA: 0
SHORTNESS OF BREATH: 0
WHEEZING: 0
ABDOMINAL PAIN: 0

## 2022-12-15 NOTE — PROGRESS NOTES
Jude Rayo (: 1985) is a 40 y.o. male, Established patient, who presents today for:    Chief Complaint   Patient presents with    Follow-up         ASSESSMENT/PLAN    1. Mild intermittent reactive airway disease  Assessment & Plan:  No reported respiratory complaints with use of Dulera and as needed albuterol. Patient instructed to continue with current medication and keep chronic follow-up visits with pulmonology office. 2. Pre-diabetes  Assessment & Plan:  Reviewed with patient the importance of a lower carbohydrate diet and the importance of continued routine exercise working toward a goal of 150 minutes/week. We will continue to monitor lab work over time. Orders:  -     Hemoglobin A1C; Future  -     Hemoglobin A1C; Future  -     Comprehensive Metabolic Panel; Future  3. Vitamin D deficiency  Assessment & Plan:  I stressed with patient the importance of taking vitamin D supplement on a daily basis as prescribed. Patient will return to the lab in 1 to 2 months for repeat vitamin D level. Orders:  -     Vitamin D 25 Hydroxy; Future  -     Vitamin D 25 Hydroxy; Future  4. Need for hepatitis C screening test  -     Hepatitis C Antibody; Future  5. Screening cholesterol level  -     Lipid Panel; Future  6. Need for vaccination  Comments:  Reviewed with patient risks of serious morbidity and even mortality with remaining unvaccinated against influenza and COVID-19. Orders:  -     Tdap, BOOSTRIX, (age 8 yrs+), IM    Return in 6 months (on 6/15/2023) for Annual Physical.       SUBJECTIVE/OBJECTIVE:    HPI    Patient presents for follow-up visit regarding cough and wheezing. He has established care with pulmonology and was started on [facility] for further management. There was some concern about potential components of acid reflux contributing to cough so patient was also started empirically on pantoprazole to see if this provided any benefit.   Patient reports feeling much improved overall with resolution of cough and wheezing since using inhaler medication. Patient denies any dyspnea at rest, chest tightness, or limitation in normal day-to-day activity due to breathing. Patient denies any chest pain,palpitations, lightheadedness, dizziness, worsening lower extremity edema, or syncope. Patient denies any change in appetite, weight changes, dysphagia, early satiety, increased belching, sour brash/heartburn, abdominal pain, nausea/vomiting, diarrhea, constipation, melena, rectal bleeding, or hematochezia. They deny any polyuria or polydipsia, new onset vision changes, or new onset paresthesias. Current Outpatient Medications on File Prior to Visit   Medication Sig Dispense Refill    mometasone-formoterol (DULERA) 100-5 MCG/ACT inhaler Inhale 2 puffs into the lungs 2 times daily 1 each 3    pantoprazole (PROTONIX) 40 MG tablet Take 1 tablet by mouth every morning (before breakfast) 30 tablet 3    Cholecalciferol (VITAMIN D3) 50 MCG (2000 UT) CAPS Take 1 capsule by mouth daily 90 capsule 1    acetaminophen (TYLENOL) 500 MG tablet Take 500 mg by mouth every 6 hours as needed for Pain      albuterol sulfate HFA (PROVENTIL HFA) 108 (90 Base) MCG/ACT inhaler Inhale 2 puffs into the lungs every 6 hours as needed for Wheezing or Shortness of Breath 18 g 0    IBUPROFEN PO Take by mouth       No current facility-administered medications on file prior to visit. No Known Allergies     Review of Systems   Constitutional:  Negative for appetite change, chills, diaphoresis, fatigue, fever and unexpected weight change. Eyes:  Negative for visual disturbance. Respiratory:  Negative for cough, chest tightness, shortness of breath and wheezing. Cardiovascular:  Negative for chest pain, palpitations and leg swelling. No orthopnea, No PND   Gastrointestinal:  Negative for abdominal pain, anal bleeding, blood in stool, constipation, diarrhea, nausea and vomiting.         No heartburn, No melena Endocrine: Negative for cold intolerance, heat intolerance, polydipsia, polyphagia and polyuria. Genitourinary:  Negative for dysuria and hematuria. Musculoskeletal:  Negative for myalgias. Skin:  Negative for rash. Neurological:  Negative for dizziness, syncope, weakness, light-headedness, numbness and headaches. Vitals:  /62 (Site: Left Upper Arm, Position: Sitting, Cuff Size: Large Adult)   Pulse 92   Temp 98.2 °F (36.8 °C) (Temporal)   Ht 5' 6.5\" (1.689 m)   Wt 182 lb 6.4 oz (82.7 kg)   SpO2 99%   BMI 29.00 kg/m²     Physical Exam  Vitals reviewed. Constitutional:       General: He is not in acute distress. Appearance: He is not ill-appearing. Eyes:      General: No scleral icterus. Neck:      Thyroid: No thyroid mass, thyromegaly or thyroid tenderness. Vascular: No carotid bruit. Cardiovascular:      Rate and Rhythm: Normal rate and regular rhythm. Heart sounds: Normal heart sounds. No murmur heard. Pulmonary:      Effort: Pulmonary effort is normal. No respiratory distress. Breath sounds: Normal breath sounds. No wheezing, rhonchi or rales. Abdominal:      General: Bowel sounds are normal. There is no distension. Palpations: Abdomen is soft. Tenderness: There is no abdominal tenderness. There is no right CVA tenderness, left CVA tenderness, guarding or rebound. Musculoskeletal:      Cervical back: Neck supple. Right lower leg: No edema. Left lower leg: No edema. Lymphadenopathy:      Cervical: No cervical adenopathy. Skin:     Findings: No rash. Neurological:      Mental Status: He is alert and oriented to person, place, and time. Psychiatric:         Mood and Affect: Mood normal.         Behavior: Behavior normal.         Thought Content: Thought content normal.       Ortho Exam (If Applicable)              An electronic signature was used to authenticate this note.      Gemma Rice MD

## 2022-12-16 NOTE — ASSESSMENT & PLAN NOTE
I stressed with patient the importance of taking vitamin D supplement on a daily basis as prescribed. Patient will return to the lab in 1 to 2 months for repeat vitamin D level.

## 2022-12-16 NOTE — ASSESSMENT & PLAN NOTE
No reported respiratory complaints with use of Dulera and as needed albuterol. Patient instructed to continue with current medication and keep chronic follow-up visits with pulmonology office.

## 2022-12-16 NOTE — ASSESSMENT & PLAN NOTE
Reviewed with patient the importance of a lower carbohydrate diet and the importance of continued routine exercise working toward a goal of 150 minutes/week. We will continue to monitor lab work over time.

## 2023-01-27 ENCOUNTER — OFFICE VISIT (OUTPATIENT)
Dept: PULMONOLOGY | Age: 38
End: 2023-01-27

## 2023-01-27 ENCOUNTER — HOSPITAL ENCOUNTER (OUTPATIENT)
Dept: LAB | Age: 38
Discharge: HOME | End: 2023-01-27
Payer: COMMERCIAL

## 2023-01-27 VITALS
BODY MASS INDEX: 30.05 KG/M2 | WEIGHT: 189 LBS | SYSTOLIC BLOOD PRESSURE: 118 MMHG | HEART RATE: 68 BPM | DIASTOLIC BLOOD PRESSURE: 68 MMHG | OXYGEN SATURATION: 98 %

## 2023-01-27 DIAGNOSIS — Z13.220 SCREENING CHOLESTEROL LEVEL: ICD-10-CM

## 2023-01-27 DIAGNOSIS — K21.9 GASTROESOPHAGEAL REFLUX DISEASE WITHOUT ESOPHAGITIS: ICD-10-CM

## 2023-01-27 DIAGNOSIS — R73.03 PRE-DIABETES: ICD-10-CM

## 2023-01-27 DIAGNOSIS — Z11.59 NEED FOR HEPATITIS C SCREENING TEST: ICD-10-CM

## 2023-01-27 DIAGNOSIS — J45.20 MILD INTERMITTENT REACTIVE AIRWAY DISEASE WITHOUT COMPLICATION: ICD-10-CM

## 2023-01-27 DIAGNOSIS — E55.9 VITAMIN D DEFICIENCY: ICD-10-CM

## 2023-01-27 DIAGNOSIS — R05.8 OTHER COUGH: Primary | ICD-10-CM

## 2023-01-27 LAB — HBA1C MFR BLD: 6.1 % (ref 4.8–5.9)

## 2023-01-27 PROCEDURE — 83036 HEMOGLOBIN GLYCOSYLATED A1C: CPT

## 2023-01-27 PROCEDURE — 86803 HEPATITIS C AB TEST: CPT

## 2023-01-27 PROCEDURE — 82306 VITAMIN D 25 HYDROXY: CPT

## 2023-01-27 PROCEDURE — 36415 COLL VENOUS BLD VENIPUNCTURE: CPT

## 2023-01-27 ASSESSMENT — ENCOUNTER SYMPTOMS
NAUSEA: 0
DIARRHEA: 0
RHINORRHEA: 0
COUGH: 0
VOICE CHANGE: 0
SHORTNESS OF BREATH: 0
CHEST TIGHTNESS: 0
SORE THROAT: 0
ABDOMINAL PAIN: 0
EYE ITCHING: 0
WHEEZING: 0
VOMITING: 0

## 2023-01-27 NOTE — PROGRESS NOTES
`1  Subjective:     Dania Lozada is a 40 y.o. male who complains today of:     Chief Complaint   Patient presents with    Follow-up     4m f/u for cough, gerd        HPI  He is doing better . He said he is not using dulera 100-5 2 puff BID or  albuterol HFA    He also stop using  protonix. Tessalon pear prn . No C/o shortness of breath   No Wheezing    No cough   No Hemoptysis. No Chest tightness unless he cough a lot . No Chest pain with radiation or pleuritic pain. no ankle edema   No orthopnea. No Fever or chills. Rhinorrhea and postnasal drip. 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 Allergies:  Patient has no known allergies. Past Medical History:   Diagnosis Date    Gastroesophageal reflux disease without esophagitis 12/15/2022    Heart murmur     Mild intermittent reactive airway disease 12/15/2022     Past Surgical History:   Procedure Laterality Date    DENTAL SURGERY       No family history on file.   Social History     Socioeconomic History    Marital status:      Spouse name: Not on file    Number of children: Not on file    Years of education: Not on file    Highest education level: Not on file   Occupational History    Not on file   Tobacco Use    Smoking status: Never     Passive exposure: Current    Smokeless tobacco: Never    Tobacco comments:     Wife smokes   Vaping Use    Vaping Use: Never used   Substance and Sexual Activity    Alcohol use: Yes     Comment: occasional    Drug use: No    Sexual activity: Yes     Partners: Female   Other Topics Concern    Not on file   Social History Narrative    Not on file     Social Determinants of Health     Financial Resource Strain: Low Risk     Difficulty of Paying Living Expenses: Not hard at all   Food Insecurity: No Food Insecurity    Worried About Running Out of Food in the Last Year: Never true    Ran Out of Food in the Last Year: Never true   Transportation Needs: Not on file   Physical Activity: Not on file   Stress: Not on file   Social Connections: Not on file   Intimate Partner Violence: Not on file   Housing Stability: Not on file         Review of Systems   Constitutional:  Negative for chills, diaphoresis, fatigue and fever. HENT:  Negative for congestion, mouth sores, nosebleeds, postnasal drip, rhinorrhea, sneezing, sore throat and voice change. Eyes:  Negative for itching and visual disturbance. Respiratory:  Negative for cough, chest tightness, shortness of breath and wheezing. Cardiovascular: Negative. Negative for chest pain, palpitations and leg swelling. Gastrointestinal:  Negative for abdominal pain, diarrhea, nausea and vomiting. Genitourinary:  Negative for difficulty urinating and hematuria. Musculoskeletal:  Negative for arthralgias, joint swelling and myalgias. Skin:  Negative for rash. Allergic/Immunologic: Negative for environmental allergies. Neurological:  Negative for dizziness, tremors, weakness and headaches. Psychiatric/Behavioral:  Negative for behavioral problems and sleep disturbance.        :     Vitals:    01/27/23 0934   BP: 118/68   Site: Left Upper Arm   Position: Sitting   Cuff Size: Medium Adult   Pulse: 68   SpO2: 98%   Weight: 189 lb (85.7 kg)     Wt Readings from Last 3 Encounters:   01/27/23 189 lb (85.7 kg)   12/15/22 182 lb 6.4 oz (82.7 kg)   09/23/22 180 lb (81.6 kg)         Physical Exam  Constitutional:       Appearance: He is well-developed. HENT:      Head: Normocephalic and atraumatic. Nose: Nose normal.   Eyes:      Conjunctiva/sclera: Conjunctivae normal.      Pupils: Pupils are equal, round, and reactive to light. Neck:      Thyroid: No thyromegaly. Vascular: No JVD. Trachea: No tracheal deviation. Cardiovascular:      Rate and Rhythm: Normal rate and regular rhythm. Heart sounds: No murmur heard. No friction rub. No gallop.    Pulmonary:      Effort: Pulmonary effort is normal. No respiratory distress. Breath sounds: Normal breath sounds. No wheezing or rales. Chest:      Chest wall: No tenderness. Abdominal:      General: There is no distension. Musculoskeletal:         General: Normal range of motion. Lymphadenopathy:      Cervical: No cervical adenopathy. Skin:     General: Skin is warm and dry. Findings: No rash. Neurological:      Mental Status: He is alert and oriented to person, place, and time. Cranial Nerves: No cranial nerve deficit. Psychiatric:         Behavior: Behavior normal.       Current Outpatient Medications   Medication Sig Dispense Refill    Cholecalciferol (VITAMIN D3) 50 MCG (2000 UT) CAPS Take 1 capsule by mouth daily 90 capsule 1    acetaminophen (TYLENOL) 500 MG tablet Take 500 mg by mouth every 6 hours as needed for Pain      albuterol sulfate HFA (PROVENTIL HFA) 108 (90 Base) MCG/ACT inhaler Inhale 2 puffs into the lungs every 6 hours as needed for Wheezing or Shortness of Breath 18 g 0    IBUPROFEN PO Take by mouth      mometasone-formoterol (DULERA) 100-5 MCG/ACT inhaler Inhale 2 puffs into the lungs 2 times daily (Patient not taking: Reported on 1/27/2023) 1 each 3    pantoprazole (PROTONIX) 40 MG tablet Take 1 tablet by mouth every morning (before breakfast) (Patient not taking: Reported on 1/27/2023) 30 tablet 3     No current facility-administered medications for this visit. Results for orders placed during the hospital encounter of 08/19/22    XR CHEST (2 VW)    Narrative  Exam: XR CHEST (2 VW)    CLINICAL HISTORY: R05.9 Cough ICD10    COMPARISON: None    CHEST X-ray, 2 VIEWS    FINDINGS:    The cardiomediastinal silhouette is within normal limits. There are no infiltrates, consolidations or effusions. .  Bones of the thorax appear intact. Impression  No radiographic evidence of acute intrathoracic process.       Results for orders placed during the hospital encounter of 07/18/22    XR CHEST (2 VW)    Narrative  DIAGNOSIS:R06.02 Shortness of breath ICD10    COMPARISON: March 9, 2015    TECHNIQUE: PA and lateral chest    FINDINGS: The lungs contain no focal infiltrate, pleural effusion, consolidation or pneumothorax. The cardiac silhouette is not enlarged. The osseous structures are grossly intact. Impression  No acute cardiopulmonary process      Results for orders placed during the hospital encounter of 03/09/15    XR Chest Standard TWO VW    Narrative  EXAMINATION CHEST, TWO VIEWS    CLINICAL INFORMATION COUGH. FINDINGS Cardiomediastinal silhouette and pulmonary vascularity are  normal. There are no definite infiltrates or effusions. There is no  evidence of adenopathy. IMPRESSION    NEGATIVE CHEST. Gio Freeman M.D. Released By- Harlen Severin M.D. Released Date Time- 03/10/15 1733  This document has been electronically signed. ------------------------------------------------------------------------------      Assessment/Plan:     1. Other cough  He is doing better. No cough . No C/o shortness of breath    Tessalon pear prn . 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 2. Gastroesophageal reflux disease without esophagitis  He also stop using  protonix. No symptoms of GERD    3.  Mild intermittent reactive airway disease without complication  No Wheezing  He said he is not using dulera 100-5 2 puff BID or  albuterol HFA   at this time        No follow up needed at this time , he will call office if needed    Katheryn Urbano MD

## 2023-01-28 LAB
HEPATITIS C ANTIBODY: NONREACTIVE
VITAMIN D 25-HYDROXY: 28.2 NG/ML

## 2023-02-02 DIAGNOSIS — R73.03 PRE-DIABETES: Primary | ICD-10-CM

## 2023-02-02 DIAGNOSIS — Z13.220 SCREENING CHOLESTEROL LEVEL: ICD-10-CM

## 2023-02-02 DIAGNOSIS — N28.9 MILD RENAL INSUFFICIENCY: ICD-10-CM

## 2023-02-02 DIAGNOSIS — E55.9 VITAMIN D DEFICIENCY: ICD-10-CM

## 2023-02-02 RX ORDER — ACETAMINOPHEN 160 MG
1 TABLET,DISINTEGRATING ORAL DAILY
Qty: 90 CAPSULE | Refills: 1 | Status: SHIPPED | OUTPATIENT
Start: 2023-02-02

## 2023-02-03 ENCOUNTER — HOSPITAL ENCOUNTER (OUTPATIENT)
Dept: LAB | Age: 38
Discharge: HOME | End: 2023-02-03
Payer: COMMERCIAL

## 2023-02-03 DIAGNOSIS — R73.03 PRE-DIABETES: ICD-10-CM

## 2023-02-03 DIAGNOSIS — N28.9 MILD RENAL INSUFFICIENCY: ICD-10-CM

## 2023-02-03 DIAGNOSIS — Z13.220 SCREENING CHOLESTEROL LEVEL: ICD-10-CM

## 2023-02-03 LAB
ALBUMIN SERPL-MCNC: 4.3 G/DL (ref 3.5–4.6)
ALP BLD-CCNC: 62 U/L (ref 35–104)
ALT SERPL-CCNC: 21 U/L (ref 0–41)
ANION GAP SERPL CALCULATED.3IONS-SCNC: 10 MEQ/L (ref 9–15)
AST SERPL-CCNC: 20 U/L (ref 0–40)
BILIRUB SERPL-MCNC: 0.8 MG/DL (ref 0.2–0.7)
BUN BLDV-MCNC: 21 MG/DL (ref 6–20)
CALCIUM SERPL-MCNC: 9.3 MG/DL (ref 8.5–9.9)
CHLORIDE BLD-SCNC: 100 MEQ/L (ref 95–107)
CHOLESTEROL, TOTAL: 274 MG/DL (ref 0–199)
CO2: 26 MEQ/L (ref 20–31)
CREAT SERPL-MCNC: 1.06 MG/DL (ref 0.7–1.2)
GFR SERPL CREATININE-BSD FRML MDRD: >60 ML/MIN/{1.73_M2}
GLOBULIN: 2.8 G/DL (ref 2.3–3.5)
GLUCOSE BLD-MCNC: 102 MG/DL (ref 70–99)
HBA1C MFR BLD: 6 % (ref 4.8–5.9)
HDLC SERPL-MCNC: 40 MG/DL (ref 40–59)
LDL CHOLESTEROL CALCULATED: 203 MG/DL (ref 0–129)
POTASSIUM SERPL-SCNC: 4.3 MEQ/L (ref 3.4–4.9)
SODIUM BLD-SCNC: 136 MEQ/L (ref 135–144)
TOTAL PROTEIN: 7.1 G/DL (ref 6.3–8)
TRIGL SERPL-MCNC: 156 MG/DL (ref 0–150)

## 2023-02-03 PROCEDURE — 80061 LIPID PANEL: CPT

## 2023-02-03 PROCEDURE — 83036 HEMOGLOBIN GLYCOSYLATED A1C: CPT

## 2023-02-03 PROCEDURE — 80053 COMPREHEN METABOLIC PANEL: CPT

## 2023-02-03 PROCEDURE — 36415 COLL VENOUS BLD VENIPUNCTURE: CPT

## 2023-02-08 ENCOUNTER — PATIENT MESSAGE (OUTPATIENT)
Dept: FAMILY MEDICINE CLINIC | Age: 38
End: 2023-02-08

## 2023-02-08 DIAGNOSIS — E78.2 HYPERLIPIDEMIA, MIXED: ICD-10-CM

## 2023-02-09 PROBLEM — E78.2 HYPERLIPIDEMIA, MIXED: Status: ACTIVE | Noted: 2023-02-09

## 2023-02-09 RX ORDER — ROSUVASTATIN CALCIUM 10 MG/1
10 TABLET, COATED ORAL DAILY
Qty: 90 TABLET | Refills: 1 | Status: SHIPPED | OUTPATIENT
Start: 2023-02-09

## 2023-02-09 NOTE — TELEPHONE ENCOUNTER
From: Rosemary Diaz  To: Dr. Bri Núñez: 2/8/2023 10:13 AM EST  Subject: Question regarding LIPID PANEL    Yes go ahead and send the prescription through.  Thank you

## 2023-06-23 ASSESSMENT — PATIENT HEALTH QUESTIONNAIRE - PHQ9
1. LITTLE INTEREST OR PLEASURE IN DOING THINGS: 0
SUM OF ALL RESPONSES TO PHQ9 QUESTIONS 1 & 2: 0
1. LITTLE INTEREST OR PLEASURE IN DOING THINGS: NOT AT ALL
2. FEELING DOWN, DEPRESSED OR HOPELESS: NOT AT ALL
2. FEELING DOWN, DEPRESSED OR HOPELESS: 0
SUM OF ALL RESPONSES TO PHQ QUESTIONS 1-9: 0
SUM OF ALL RESPONSES TO PHQ9 QUESTIONS 1 & 2: 0
SUM OF ALL RESPONSES TO PHQ QUESTIONS 1-9: 0

## 2023-06-26 ENCOUNTER — OFFICE VISIT (OUTPATIENT)
Dept: FAMILY MEDICINE CLINIC | Age: 38
End: 2023-06-26

## 2023-06-26 VITALS
TEMPERATURE: 98 F | DIASTOLIC BLOOD PRESSURE: 76 MMHG | OXYGEN SATURATION: 98 % | HEIGHT: 67 IN | SYSTOLIC BLOOD PRESSURE: 110 MMHG | WEIGHT: 177.4 LBS | BODY MASS INDEX: 27.84 KG/M2 | HEART RATE: 74 BPM

## 2023-06-26 DIAGNOSIS — R73.03 PRE-DIABETES: ICD-10-CM

## 2023-06-26 DIAGNOSIS — Z23 NEED FOR VACCINATION: ICD-10-CM

## 2023-06-26 DIAGNOSIS — Z00.00 WELL ADULT EXAM: Primary | ICD-10-CM

## 2023-06-26 DIAGNOSIS — E55.9 VITAMIN D DEFICIENCY: ICD-10-CM

## 2023-06-26 DIAGNOSIS — E78.2 HYPERLIPIDEMIA, MIXED: ICD-10-CM

## 2023-06-26 DIAGNOSIS — Z11.4 SCREENING FOR HIV (HUMAN IMMUNODEFICIENCY VIRUS): ICD-10-CM

## 2023-06-26 SDOH — ECONOMIC STABILITY: FOOD INSECURITY: WITHIN THE PAST 12 MONTHS, YOU WORRIED THAT YOUR FOOD WOULD RUN OUT BEFORE YOU GOT MONEY TO BUY MORE.: NEVER TRUE

## 2023-06-26 SDOH — ECONOMIC STABILITY: INCOME INSECURITY: HOW HARD IS IT FOR YOU TO PAY FOR THE VERY BASICS LIKE FOOD, HOUSING, MEDICAL CARE, AND HEATING?: NOT HARD AT ALL

## 2023-06-26 SDOH — ECONOMIC STABILITY: HOUSING INSECURITY
IN THE LAST 12 MONTHS, WAS THERE A TIME WHEN YOU DID NOT HAVE A STEADY PLACE TO SLEEP OR SLEPT IN A SHELTER (INCLUDING NOW)?: NO

## 2023-06-26 SDOH — ECONOMIC STABILITY: FOOD INSECURITY: WITHIN THE PAST 12 MONTHS, THE FOOD YOU BOUGHT JUST DIDN'T LAST AND YOU DIDN'T HAVE MONEY TO GET MORE.: NEVER TRUE

## 2023-06-26 ASSESSMENT — ENCOUNTER SYMPTOMS
RHINORRHEA: 0
NAUSEA: 0
COUGH: 0
ABDOMINAL PAIN: 0
EYE DISCHARGE: 0
ABDOMINAL DISTENTION: 0
ANAL BLEEDING: 0
BLOOD IN STOOL: 0
CHEST TIGHTNESS: 0
EYE PAIN: 0
DIARRHEA: 0
COLOR CHANGE: 0
SORE THROAT: 0
VOMITING: 0
WHEEZING: 0
TROUBLE SWALLOWING: 0
SINUS PRESSURE: 0
CONSTIPATION: 0
SHORTNESS OF BREATH: 0

## 2023-12-16 ENCOUNTER — HOSPITAL ENCOUNTER (OUTPATIENT)
Dept: LAB | Age: 38
Discharge: HOME | End: 2023-12-16
Payer: COMMERCIAL

## 2023-12-16 DIAGNOSIS — E78.2 HYPERLIPIDEMIA, MIXED: ICD-10-CM

## 2023-12-16 DIAGNOSIS — Z11.4 SCREENING FOR HIV (HUMAN IMMUNODEFICIENCY VIRUS): ICD-10-CM

## 2023-12-16 DIAGNOSIS — E55.9 VITAMIN D DEFICIENCY: ICD-10-CM

## 2023-12-16 DIAGNOSIS — R73.03 PRE-DIABETES: ICD-10-CM

## 2023-12-16 LAB
ALBUMIN SERPL-MCNC: 4.4 G/DL (ref 3.5–4.6)
ALP SERPL-CCNC: 68 U/L (ref 35–104)
ALT SERPL-CCNC: 19 U/L (ref 0–41)
ANION GAP SERPL CALCULATED.3IONS-SCNC: 12 MEQ/L (ref 9–15)
AST SERPL-CCNC: 19 U/L (ref 0–40)
BILIRUB SERPL-MCNC: 0.6 MG/DL (ref 0.2–0.7)
BUN SERPL-MCNC: 21 MG/DL (ref 6–20)
CALCIUM SERPL-MCNC: 9.3 MG/DL (ref 8.5–9.9)
CHLORIDE SERPL-SCNC: 106 MEQ/L (ref 95–107)
CHOLEST SERPL-MCNC: 236 MG/DL (ref 0–199)
CO2 SERPL-SCNC: 25 MEQ/L (ref 20–31)
CREAT SERPL-MCNC: 1.12 MG/DL (ref 0.7–1.2)
GLOBULIN SER CALC-MCNC: 2.5 G/DL (ref 2.3–3.5)
GLUCOSE SERPL-MCNC: 114 MG/DL (ref 70–99)
HBA1C MFR BLD: 6.1 % (ref 4.8–5.9)
HDLC SERPL-MCNC: 34 MG/DL (ref 40–59)
LDLC SERPL CALC-MCNC: 170 MG/DL (ref 0–129)
POTASSIUM SERPL-SCNC: 4.4 MEQ/L (ref 3.4–4.9)
PROT SERPL-MCNC: 6.9 G/DL (ref 6.3–8)
SODIUM SERPL-SCNC: 143 MEQ/L (ref 135–144)
TRIGL SERPL-MCNC: 160 MG/DL (ref 0–150)

## 2023-12-16 PROCEDURE — 80061 LIPID PANEL: CPT

## 2023-12-16 PROCEDURE — 82306 VITAMIN D 25 HYDROXY: CPT

## 2023-12-16 PROCEDURE — 36415 COLL VENOUS BLD VENIPUNCTURE: CPT

## 2023-12-16 PROCEDURE — 87389 HIV-1 AG W/HIV-1&-2 AB AG IA: CPT

## 2023-12-16 PROCEDURE — 80053 COMPREHEN METABOLIC PANEL: CPT

## 2023-12-16 PROCEDURE — 83036 HEMOGLOBIN GLYCOSYLATED A1C: CPT

## 2023-12-17 LAB
HIV AG/AB: NONREACTIVE
VITAMIN D 25-HYDROXY: 22.8 NG/ML

## 2023-12-18 PROBLEM — Z82.49 FAMILY HISTORY OF ISCHEMIC HEART DISEASE: Status: ACTIVE | Noted: 2023-12-18

## 2023-12-18 PROBLEM — E66.9 OBESITY: Status: ACTIVE | Noted: 2023-12-18

## 2024-06-15 ENCOUNTER — HOSPITAL ENCOUNTER (OUTPATIENT)
Dept: LAB | Age: 39
Discharge: HOME | End: 2024-06-15
Payer: COMMERCIAL

## 2024-06-15 DIAGNOSIS — E78.2 HYPERLIPIDEMIA, MIXED: ICD-10-CM

## 2024-06-15 DIAGNOSIS — E66.9 CLASS 1 OBESITY WITH SERIOUS COMORBIDITY AND BODY MASS INDEX (BMI) OF 31.0 TO 31.9 IN ADULT, UNSPECIFIED OBESITY TYPE: ICD-10-CM

## 2024-06-15 DIAGNOSIS — R73.03 PRE-DIABETES: ICD-10-CM

## 2024-06-15 DIAGNOSIS — E55.9 VITAMIN D DEFICIENCY: ICD-10-CM

## 2024-06-15 LAB
ALBUMIN SERPL-MCNC: 4.2 G/DL (ref 3.5–4.6)
ALP SERPL-CCNC: 65 U/L (ref 35–104)
ALT SERPL-CCNC: 19 U/L (ref 0–41)
ANION GAP SERPL CALCULATED.3IONS-SCNC: 9 MEQ/L (ref 9–15)
AST SERPL-CCNC: 19 U/L (ref 0–40)
BILIRUB SERPL-MCNC: 0.6 MG/DL (ref 0.2–0.7)
BUN SERPL-MCNC: 17 MG/DL (ref 6–20)
CALCIUM SERPL-MCNC: 9.2 MG/DL (ref 8.5–9.9)
CHLORIDE SERPL-SCNC: 103 MEQ/L (ref 95–107)
CHOLEST SERPL-MCNC: 256 MG/DL (ref 0–199)
CO2 SERPL-SCNC: 26 MEQ/L (ref 20–31)
CREAT SERPL-MCNC: 1.2 MG/DL (ref 0.7–1.2)
GLOBULIN SER CALC-MCNC: 2.4 G/DL (ref 2.3–3.5)
GLUCOSE SERPL-MCNC: 121 MG/DL (ref 70–99)
HDLC SERPL-MCNC: 33 MG/DL (ref 40–59)
LDLC SERPL CALC-MCNC: 172 MG/DL (ref 0–129)
POTASSIUM SERPL-SCNC: 4.4 MEQ/L (ref 3.4–4.9)
PROT SERPL-MCNC: 6.6 G/DL (ref 6.3–8)
SODIUM SERPL-SCNC: 138 MEQ/L (ref 135–144)
TRIGL SERPL-MCNC: 254 MG/DL (ref 0–150)
TSH SERPL-MCNC: 2.44 UIU/ML (ref 0.44–3.86)

## 2024-06-15 PROCEDURE — 84443 ASSAY THYROID STIM HORMONE: CPT

## 2024-06-15 PROCEDURE — 82306 VITAMIN D 25 HYDROXY: CPT

## 2024-06-15 PROCEDURE — 83036 HEMOGLOBIN GLYCOSYLATED A1C: CPT

## 2024-06-15 PROCEDURE — 80061 LIPID PANEL: CPT

## 2024-06-15 PROCEDURE — 80053 COMPREHEN METABOLIC PANEL: CPT

## 2024-06-15 PROCEDURE — 36415 COLL VENOUS BLD VENIPUNCTURE: CPT

## 2024-06-15 PROCEDURE — 87389 HIV-1 AG W/HIV-1&-2 AB AG IA: CPT

## 2024-06-15 ASSESSMENT — PATIENT HEALTH QUESTIONNAIRE - PHQ9
SUM OF ALL RESPONSES TO PHQ9 QUESTIONS 1 & 2: 0
SUM OF ALL RESPONSES TO PHQ QUESTIONS 1-9: 0
SUM OF ALL RESPONSES TO PHQ QUESTIONS 1-9: 0
2. FEELING DOWN, DEPRESSED OR HOPELESS: NOT AT ALL
1. LITTLE INTEREST OR PLEASURE IN DOING THINGS: NOT AT ALL
SUM OF ALL RESPONSES TO PHQ QUESTIONS 1-9: 0
1. LITTLE INTEREST OR PLEASURE IN DOING THINGS: NOT AT ALL
SUM OF ALL RESPONSES TO PHQ9 QUESTIONS 1 & 2: 0
SUM OF ALL RESPONSES TO PHQ QUESTIONS 1-9: 0
2. FEELING DOWN, DEPRESSED OR HOPELESS: NOT AT ALL

## 2024-06-16 LAB
ESTIMATED AVERAGE GLUCOSE: 120 MG/DL
HBA1C MFR BLD: 5.8 % (ref 4–6)
HIV AG/AB: NONREACTIVE
VITAMIN D 25-HYDROXY: 30.9 NG/ML (ref 30–100)

## 2024-06-17 ENCOUNTER — OFFICE VISIT (OUTPATIENT)
Dept: FAMILY MEDICINE CLINIC | Age: 39
End: 2024-06-17
Payer: COMMERCIAL

## 2024-06-17 VITALS
DIASTOLIC BLOOD PRESSURE: 70 MMHG | SYSTOLIC BLOOD PRESSURE: 112 MMHG | OXYGEN SATURATION: 98 % | HEART RATE: 81 BPM | WEIGHT: 185 LBS | TEMPERATURE: 97.9 F | HEIGHT: 67 IN | BODY MASS INDEX: 29.03 KG/M2

## 2024-06-17 DIAGNOSIS — J45.20 MILD INTERMITTENT REACTIVE AIRWAY DISEASE: ICD-10-CM

## 2024-06-17 DIAGNOSIS — Z00.00 LABORATORY TESTS ORDERED AS PART OF A COMPLETE PHYSICAL EXAM (CPE): ICD-10-CM

## 2024-06-17 DIAGNOSIS — E55.9 VITAMIN D DEFICIENCY: ICD-10-CM

## 2024-06-17 DIAGNOSIS — E78.2 HYPERLIPIDEMIA, MIXED: Primary | ICD-10-CM

## 2024-06-17 DIAGNOSIS — R73.03 PRE-DIABETES: ICD-10-CM

## 2024-06-17 PROCEDURE — 99214 OFFICE O/P EST MOD 30 MIN: CPT | Performed by: FAMILY MEDICINE

## 2024-06-17 RX ORDER — ATORVASTATIN CALCIUM 10 MG/1
10 TABLET, FILM COATED ORAL DAILY
Qty: 90 TABLET | Refills: 1 | Status: SHIPPED | OUTPATIENT
Start: 2024-06-17

## 2024-06-17 RX ORDER — ACETAMINOPHEN 160 MG
1 TABLET,DISINTEGRATING ORAL DAILY
Qty: 90 CAPSULE | Refills: 1 | Status: SHIPPED | OUTPATIENT
Start: 2024-06-17

## 2024-06-17 ASSESSMENT — ENCOUNTER SYMPTOMS
CHEST TIGHTNESS: 0
ANAL BLEEDING: 0
BLOOD IN STOOL: 0
CONSTIPATION: 0
VOMITING: 0
ABDOMINAL PAIN: 0
SHORTNESS OF BREATH: 0
DIARRHEA: 0
NAUSEA: 0
COUGH: 0
WHEEZING: 0

## 2024-06-17 NOTE — ASSESSMENT & PLAN NOTE
Patient with worsening elevations in lipid panel values on most recent testing.  He was again urged to double down on efforts to eat a lower carbohydrate and lower saturated fat diet.  Secondary to his known family history of ischemic heart disease and history of elevated LDL >190 with frequent values >160 I reviewed with him again the benefits of starting a statin for future risk reduction.  Patient agrees to start atorvastatin 10 mg daily.  He will keep close follow-up in the next 4 to 6 months for repeat cholesterol testing.

## 2024-06-17 NOTE — ASSESSMENT & PLAN NOTE
No reported respiratory complaints.  Patient instructed to continue with as needed use of albuterol.

## 2024-06-17 NOTE — ASSESSMENT & PLAN NOTE
Stable A1c on most recent lab work at the low end of the prediabetic range.  Patient urged to double down on efforts to eat a lower carbohydrate diet and work toward a goal of 150 minutes of exercise per week.

## 2024-06-17 NOTE — PROGRESS NOTES
and Rhythm: Normal rate and regular rhythm.      Heart sounds: Normal heart sounds. No murmur heard.  Pulmonary:      Effort: Pulmonary effort is normal. No respiratory distress.      Breath sounds: Normal breath sounds. No wheezing, rhonchi or rales.   Abdominal:      General: Bowel sounds are normal. There is no distension.      Palpations: Abdomen is soft.      Tenderness: There is no abdominal tenderness. There is no right CVA tenderness, left CVA tenderness, guarding or rebound.   Musculoskeletal:      Cervical back: Neck supple.      Right lower leg: No edema.      Left lower leg: No edema.   Lymphadenopathy:      Cervical: No cervical adenopathy.   Skin:     Findings: No rash.   Neurological:      Mental Status: He is alert and oriented to person, place, and time.   Psychiatric:         Mood and Affect: Mood normal.         Behavior: Behavior normal.         Thought Content: Thought content normal.         Ortho Exam (If Applicable)                   An electronic signature was used to authenticate this note.     JELLY FIELDS MD

## 2024-06-17 NOTE — ASSESSMENT & PLAN NOTE
Most recent vitamin D level improved and just within normal range.  Instructions were given to continue with daily 2000 IU vitamin D3 supplement.

## 2024-07-16 ENCOUNTER — PATIENT MESSAGE (OUTPATIENT)
Dept: FAMILY MEDICINE CLINIC | Age: 39
End: 2024-07-16

## 2024-07-17 NOTE — TELEPHONE ENCOUNTER
From: Dennis Diehl  To: Dr. Andrew Ortiz  Sent: 7/16/2024 8:53 PM EDT  Subject: Lipitor prescription     Can this be a side effect of this prescription, I am having trouble keeping and erection/ and even when I do it doesn’t last. This has never happened before.

## 2024-12-06 ENCOUNTER — HOSPITAL ENCOUNTER (OUTPATIENT)
Dept: LAB | Age: 39
Discharge: HOME | End: 2024-12-06
Payer: COMMERCIAL

## 2024-12-06 DIAGNOSIS — E55.9 VITAMIN D DEFICIENCY: ICD-10-CM

## 2024-12-06 DIAGNOSIS — Z00.00 LABORATORY TESTS ORDERED AS PART OF A COMPLETE PHYSICAL EXAM (CPE): ICD-10-CM

## 2024-12-06 DIAGNOSIS — E78.2 HYPERLIPIDEMIA, MIXED: ICD-10-CM

## 2024-12-06 DIAGNOSIS — R73.03 PRE-DIABETES: ICD-10-CM

## 2024-12-06 LAB
ALBUMIN SERPL-MCNC: 4.3 G/DL (ref 3.5–4.6)
ALP SERPL-CCNC: 77 U/L (ref 35–104)
ALT SERPL-CCNC: 36 U/L (ref 0–41)
ANION GAP SERPL CALCULATED.3IONS-SCNC: 8 MEQ/L (ref 9–15)
AST SERPL-CCNC: 22 U/L (ref 0–40)
BASOPHILS # BLD: 0 K/UL (ref 0–0.2)
BASOPHILS NFR BLD: 0.6 %
BILIRUB SERPL-MCNC: 0.6 MG/DL (ref 0.2–0.7)
BUN SERPL-MCNC: 15 MG/DL (ref 6–20)
CALCIUM SERPL-MCNC: 9.4 MG/DL (ref 8.5–9.9)
CHLORIDE SERPL-SCNC: 102 MEQ/L (ref 95–107)
CHOLEST SERPL-MCNC: 197 MG/DL (ref 0–199)
CO2 SERPL-SCNC: 30 MEQ/L (ref 20–31)
CREAT SERPL-MCNC: 1.19 MG/DL (ref 0.7–1.2)
EOSINOPHIL # BLD: 0.1 K/UL (ref 0–0.7)
EOSINOPHIL NFR BLD: 1.3 %
ERYTHROCYTE [DISTWIDTH] IN BLOOD BY AUTOMATED COUNT: 12.1 % (ref 11.5–14.5)
ESTIMATED AVERAGE GLUCOSE: 126 MG/DL
GLOBULIN SER CALC-MCNC: 2.5 G/DL (ref 2.3–3.5)
GLUCOSE SERPL-MCNC: 129 MG/DL (ref 70–99)
HBA1C MFR BLD: 6 % (ref 4–6)
HCT VFR BLD AUTO: 44.2 % (ref 42–52)
HDLC SERPL-MCNC: 36 MG/DL (ref 40–59)
HGB BLD-MCNC: 14.9 G/DL (ref 14–18)
LDLC SERPL CALC-MCNC: 126 MG/DL (ref 0–129)
LYMPHOCYTES # BLD: 1.2 K/UL (ref 1–4.8)
LYMPHOCYTES NFR BLD: 24.4 %
MCH RBC QN AUTO: 28 PG (ref 27–31.3)
MCHC RBC AUTO-ENTMCNC: 33.7 % (ref 33–37)
MCV RBC AUTO: 83.1 FL (ref 79–92.2)
MONOCYTES # BLD: 0.4 K/UL (ref 0.2–0.8)
MONOCYTES NFR BLD: 8.3 %
NEUTROPHILS # BLD: 3.1 K/UL (ref 1.4–6.5)
NEUTS SEG NFR BLD: 65 %
PLATELET # BLD AUTO: 224 K/UL (ref 130–400)
POTASSIUM SERPL-SCNC: 4.3 MEQ/L (ref 3.4–4.9)
PROT SERPL-MCNC: 6.8 G/DL (ref 6.3–8)
RBC # BLD AUTO: 5.32 M/UL (ref 4.7–6.1)
SODIUM SERPL-SCNC: 140 MEQ/L (ref 135–144)
TRIGL SERPL-MCNC: 175 MG/DL (ref 0–150)
VITAMIN D 25-HYDROXY: 30.2 NG/ML (ref 30–100)
WBC # BLD AUTO: 4.7 K/UL (ref 4.8–10.8)

## 2024-12-06 PROCEDURE — 80061 LIPID PANEL: CPT

## 2024-12-06 PROCEDURE — 82306 VITAMIN D 25 HYDROXY: CPT

## 2024-12-06 PROCEDURE — 80053 COMPREHEN METABOLIC PANEL: CPT

## 2024-12-06 PROCEDURE — 83036 HEMOGLOBIN GLYCOSYLATED A1C: CPT

## 2024-12-06 PROCEDURE — 36415 COLL VENOUS BLD VENIPUNCTURE: CPT

## 2024-12-06 PROCEDURE — 85025 COMPLETE CBC W/AUTO DIFF WBC: CPT

## 2024-12-06 RX ORDER — ACETAMINOPHEN 160 MG
TABLET,DISINTEGRATING ORAL DAILY
Qty: 90 CAPSULE | Refills: 1 | Status: SHIPPED | OUTPATIENT
Start: 2024-12-06

## 2024-12-06 RX ORDER — ATORVASTATIN CALCIUM 10 MG/1
10 TABLET, FILM COATED ORAL DAILY
Qty: 90 TABLET | Refills: 1 | Status: SHIPPED | OUTPATIENT
Start: 2024-12-06

## 2024-12-06 NOTE — TELEPHONE ENCOUNTER
Comments:     Last Office Visit (last PCP visit):   6/17/2024    Next Visit Date:  Future Appointments   Date Time Provider Department Center   12/30/2024  5:30 PM Andrew Ortiz MD [facility]       **If hasn't been seen in over a year OR hasn't followed up according to last diabetes/ADHD visit, make appointment for patient before sending refill to provider.    Rx requested:  Requested Prescriptions     Pending Prescriptions Disp Refills    atorvastatin (LIPITOR) 10 MG tablet [Pharmacy Med Name: atorvastatin 10 mg tablet] 90 tablet 1     Sig: Take 1 tablet by mouth daily    VITAMIN D3 50 MCG (2000 UT) CAPS capsule [Pharmacy Med Name: D3-2000 50 mcg (2,000 unit) capsule] 90 capsule 1     Sig: TAKE 1 CAPSULE BY MOUTH DAILY

## 2024-12-10 DIAGNOSIS — E78.2 HYPERLIPIDEMIA, MIXED: ICD-10-CM

## 2024-12-10 RX ORDER — ATORVASTATIN CALCIUM 20 MG/1
20 TABLET, FILM COATED ORAL DAILY
Qty: 90 TABLET | Refills: 1 | Status: SHIPPED | OUTPATIENT
Start: 2024-12-10

## 2024-12-27 SDOH — HEALTH STABILITY: PHYSICAL HEALTH: ON AVERAGE, HOW MANY MINUTES DO YOU ENGAGE IN EXERCISE AT THIS LEVEL?: 0 MIN

## 2024-12-27 SDOH — HEALTH STABILITY: PHYSICAL HEALTH: ON AVERAGE, HOW MANY DAYS PER WEEK DO YOU ENGAGE IN MODERATE TO STRENUOUS EXERCISE (LIKE A BRISK WALK)?: 0 DAYS

## 2024-12-30 ENCOUNTER — OFFICE VISIT (OUTPATIENT)
Dept: FAMILY MEDICINE CLINIC | Age: 39
End: 2024-12-30
Payer: COMMERCIAL

## 2024-12-30 VITALS
HEIGHT: 66 IN | SYSTOLIC BLOOD PRESSURE: 118 MMHG | WEIGHT: 185 LBS | BODY MASS INDEX: 29.73 KG/M2 | HEART RATE: 78 BPM | OXYGEN SATURATION: 99 % | DIASTOLIC BLOOD PRESSURE: 70 MMHG

## 2024-12-30 DIAGNOSIS — Z13.0 SCREENING, IRON DEFICIENCY ANEMIA: ICD-10-CM

## 2024-12-30 DIAGNOSIS — E55.9 VITAMIN D DEFICIENCY: ICD-10-CM

## 2024-12-30 DIAGNOSIS — E78.2 HYPERLIPIDEMIA, MIXED: ICD-10-CM

## 2024-12-30 DIAGNOSIS — R73.03 PRE-DIABETES: Primary | ICD-10-CM

## 2024-12-30 DIAGNOSIS — Z00.00 PREVENTATIVE HEALTH CARE: ICD-10-CM

## 2024-12-30 PROBLEM — E66.9 OBESITY: Status: RESOLVED | Noted: 2023-12-18 | Resolved: 2024-12-30

## 2024-12-30 PROCEDURE — 99395 PREV VISIT EST AGE 18-39: CPT | Performed by: NURSE PRACTITIONER

## 2024-12-30 RX ORDER — ATORVASTATIN CALCIUM 20 MG/1
20 TABLET, FILM COATED ORAL DAILY
Qty: 90 TABLET | Refills: 4 | Status: SHIPPED | OUTPATIENT
Start: 2024-12-30

## 2024-12-30 SDOH — ECONOMIC STABILITY: FOOD INSECURITY: WITHIN THE PAST 12 MONTHS, YOU WORRIED THAT YOUR FOOD WOULD RUN OUT BEFORE YOU GOT MONEY TO BUY MORE.: NEVER TRUE

## 2024-12-30 SDOH — ECONOMIC STABILITY: FOOD INSECURITY: WITHIN THE PAST 12 MONTHS, THE FOOD YOU BOUGHT JUST DIDN'T LAST AND YOU DIDN'T HAVE MONEY TO GET MORE.: NEVER TRUE

## 2024-12-30 SDOH — ECONOMIC STABILITY: INCOME INSECURITY: HOW HARD IS IT FOR YOU TO PAY FOR THE VERY BASICS LIKE FOOD, HOUSING, MEDICAL CARE, AND HEATING?: NOT HARD AT ALL

## 2024-12-30 NOTE — PROGRESS NOTES
79   Ht 1.689 m (5' 6.5\")   Wt 83.9 kg (185 lb)   SpO2 99%   BMI 29.42 kg/m²     Physical Exam  Constitutional:       NAD     Appearance: Normal appearance. Well groomed.  Neck:     No cervical adenopathy. Neck supple, no masses or goiters.  Cardiovascular:      Rate and Rhythm: Normal rate and regular rhythm.      Heart sounds: No murmur heard.     No friction rub.   Pulmonary:      Effort: Pulmonary effort is normal. No respiratory distress.      Breath sounds: Normal breath sounds. No wheezing or rhonchi.   Neurological:      General: No focal deficit present. Grossly intact.  Psychiatric:         Mental Status: Alert and oriented to person, place, and time. Mental status is at baseline.      Normal mood and affect. Good insight and judgement.                 Assessment & Plan  1. Hyperlipidemia.  His lipid panel has shown improvement, with total cholesterol at 197, triglycerides at 175, HDL at 36, and LDL at 126. Triglycerides were previously 254 in June. He will continue taking atorvastatin 20 mg daily. A prescription for Lipitor has been renewed for a duration of one year.    2. Prediabetes.  His A1c is stable at 6, which is at the low cutoff for prediabetes. Glucose level is 129. He is advised to maintain a healthy diet and exercise regularly to manage his blood sugar levels.    3. Mild intermittent reactive airway disease.  He has not used his albuterol inhaler for the past two years and does not require a refill. He is advised to keep the inhaler available for use as needed.    4. Vitamin D deficiency.  His last vitamin D level was 30.2, which is within the normal range. He will continue with his current vitamin D supplementation. A recheck of his vitamin D levels will be done in 6 months.    5. Obesity.  He is advised to maintain a healthy diet and exercise regularly to manage his weight.    6. Health Maintenance.  His white blood cell count is slightly below the normal range, but this is not a cause

## 2025-01-30 ENCOUNTER — TELEMEDICINE (OUTPATIENT)
Dept: FAMILY MEDICINE CLINIC | Age: 40
End: 2025-01-30

## 2025-01-30 DIAGNOSIS — J45.20 MILD INTERMITTENT REACTIVE AIRWAY DISEASE: ICD-10-CM

## 2025-01-30 DIAGNOSIS — J10.1 INFLUENZA A: Primary | ICD-10-CM

## 2025-01-30 RX ORDER — BROMPHENIRAMINE MALEATE, PSEUDOEPHEDRINE HYDROCHLORIDE, AND DEXTROMETHORPHAN HYDROBROMIDE 2; 30; 10 MG/5ML; MG/5ML; MG/5ML
5 SYRUP ORAL 4 TIMES DAILY PRN
Qty: 118 ML | Refills: 0 | Status: SHIPPED | OUTPATIENT
Start: 2025-01-30

## 2025-01-30 RX ORDER — OSELTAMIVIR PHOSPHATE 75 MG/1
75 CAPSULE ORAL 2 TIMES DAILY
Qty: 10 CAPSULE | Refills: 0 | Status: SHIPPED | OUTPATIENT
Start: 2025-01-30 | End: 2025-02-04

## 2025-01-30 RX ORDER — ALBUTEROL SULFATE 90 UG/1
2 INHALANT RESPIRATORY (INHALATION) EVERY 6 HOURS PRN
Qty: 18 G | Refills: 0 | Status: SHIPPED | OUTPATIENT
Start: 2025-01-30

## 2025-01-30 SDOH — ECONOMIC STABILITY: INCOME INSECURITY: IN THE LAST 12 MONTHS, WAS THERE A TIME WHEN YOU WERE NOT ABLE TO PAY THE MORTGAGE OR RENT ON TIME?: NO

## 2025-01-30 SDOH — ECONOMIC STABILITY: FOOD INSECURITY: WITHIN THE PAST 12 MONTHS, THE FOOD YOU BOUGHT JUST DIDN'T LAST AND YOU DIDN'T HAVE MONEY TO GET MORE.: NEVER TRUE

## 2025-01-30 SDOH — ECONOMIC STABILITY: FOOD INSECURITY: WITHIN THE PAST 12 MONTHS, YOU WORRIED THAT YOUR FOOD WOULD RUN OUT BEFORE YOU GOT MONEY TO BUY MORE.: NEVER TRUE

## 2025-01-30 SDOH — ECONOMIC STABILITY: TRANSPORTATION INSECURITY
IN THE PAST 12 MONTHS, HAS LACK OF TRANSPORTATION KEPT YOU FROM MEETINGS, WORK, OR FROM GETTING THINGS NEEDED FOR DAILY LIVING?: NO

## 2025-01-30 SDOH — ECONOMIC STABILITY: TRANSPORTATION INSECURITY
IN THE PAST 12 MONTHS, HAS THE LACK OF TRANSPORTATION KEPT YOU FROM MEDICAL APPOINTMENTS OR FROM GETTING MEDICATIONS?: NO

## 2025-01-30 ASSESSMENT — PATIENT HEALTH QUESTIONNAIRE - PHQ9
2. FEELING DOWN, DEPRESSED OR HOPELESS: NOT AT ALL
1. LITTLE INTEREST OR PLEASURE IN DOING THINGS: NOT AT ALL
SUM OF ALL RESPONSES TO PHQ QUESTIONS 1-9: 0
SUM OF ALL RESPONSES TO PHQ9 QUESTIONS 1 & 2: 0
SUM OF ALL RESPONSES TO PHQ QUESTIONS 1-9: 0

## 2025-01-30 NOTE — PROGRESS NOTES
Dennis Diehl (: 1985) is a 39 y.o. male, Established patient, who presents today for:    Chief Complaint   Patient presents with    Influenza     Patient wife tested positive for influenza a yesterday  Cough  Runny nose  Fever   Body Ache           Subjective     History of Present Illness  The patient is a 39-year-old male who presents via video visit today with concerns of influenza. He established care last month.    His wife tested positive for influenza A yesterday, and he has been experiencing similar symptoms, including cough, rhinorrhea, fever, and myalgia. His highest recorded temperature was 100 degrees Fahrenheit, which did not immediately subside with the administration of Tylenol and ibuprofen. However, these medications provided some relief from his body aches. He reports occasional dyspnea and an uncontrollable cough but does not experience wheezing. He also reports significant phlegm production. He does not require a work note at this time. He has been intermittently taking Tylenol and ibuprofen. He has found relief from his cough with Bromfed. He initiated Tamiflu therapy this morning.    He has a history of mild intermittent reactive airway disease and uses an albuterol inhaler as needed at home. He has not utilized his albuterol inhaler during this illness and is requesting a refill.    Supplemental Information  He has a history of prediabetes and hyperlipidemia.    MEDICATIONS  Current: albuterol inhaler, Tylenol, ibuprofen, Bromfed, Tamiflu               Results                 Objective     Vitals:  There were no vitals taken for this visit.    Physical Exam  Pt visualized on computer screen. Appears their normal self. No resp distress. Appears grossly neurologically intact. Denies pain. Mental status a&o x 3, normal mood and affect.        Vital Signs  Highest recorded temperature was 100 degrees Fahrenheit.               Assessment & Plan  1. Influenza A  His symptoms include

## 2025-02-17 ENCOUNTER — OFFICE VISIT (OUTPATIENT)
Dept: FAMILY MEDICINE CLINIC | Age: 40
End: 2025-02-17

## 2025-02-17 VITALS
BODY MASS INDEX: 29.42 KG/M2 | OXYGEN SATURATION: 94 % | TEMPERATURE: 97.9 F | HEART RATE: 77 BPM | SYSTOLIC BLOOD PRESSURE: 104 MMHG | WEIGHT: 185 LBS | DIASTOLIC BLOOD PRESSURE: 70 MMHG

## 2025-02-17 DIAGNOSIS — J01.10 ACUTE NON-RECURRENT FRONTAL SINUSITIS: ICD-10-CM

## 2025-02-17 DIAGNOSIS — J45.20 MILD INTERMITTENT REACTIVE AIRWAY DISEASE: ICD-10-CM

## 2025-02-17 DIAGNOSIS — R19.7 DIARRHEA, UNSPECIFIED TYPE: ICD-10-CM

## 2025-02-17 DIAGNOSIS — R11.0 NAUSEA: Primary | ICD-10-CM

## 2025-02-17 DIAGNOSIS — R09.81 CONGESTION OF PARANASAL SINUS: ICD-10-CM

## 2025-02-17 RX ORDER — FLUTICASONE PROPIONATE 50 MCG
2 SPRAY, SUSPENSION (ML) NASAL DAILY
Qty: 16 G | Refills: 2 | Status: SHIPPED | OUTPATIENT
Start: 2025-02-17

## 2025-02-17 RX ORDER — LORATADINE PSEUDOEPHEDRINE SULFATE 10; 240 MG/1; MG/1
1 TABLET, EXTENDED RELEASE ORAL DAILY
Qty: 30 TABLET | Refills: 0 | Status: SHIPPED | OUTPATIENT
Start: 2025-02-17

## 2025-02-17 RX ORDER — BENZONATATE 100 MG/1
100 CAPSULE ORAL 3 TIMES DAILY PRN
Qty: 30 CAPSULE | Refills: 0 | Status: CANCELLED | OUTPATIENT
Start: 2025-02-17

## 2025-02-17 RX ORDER — BENZONATATE 100 MG/1
100 CAPSULE ORAL 3 TIMES DAILY PRN
COMMUNITY

## 2025-02-17 RX ORDER — LOPERAMIDE HYDROCHLORIDE 2 MG/1
2 CAPSULE ORAL 4 TIMES DAILY PRN
Qty: 20 CAPSULE | Refills: 1 | Status: SHIPPED | OUTPATIENT
Start: 2025-02-17 | End: 2025-02-27

## 2025-02-17 RX ORDER — ONDANSETRON 4 MG/1
4 TABLET, ORALLY DISINTEGRATING ORAL 3 TIMES DAILY PRN
Qty: 21 TABLET | Refills: 0 | Status: SHIPPED | OUTPATIENT
Start: 2025-02-17

## 2025-02-17 ASSESSMENT — ENCOUNTER SYMPTOMS
NAUSEA: 1
VOMITING: 0
CHEST TIGHTNESS: 0
COUGH: 1
CONSTIPATION: 0
EYE DISCHARGE: 0
ABDOMINAL PAIN: 0
COLOR CHANGE: 0
SHORTNESS OF BREATH: 1
DIARRHEA: 1
WHEEZING: 1
EYE REDNESS: 0

## 2025-02-17 NOTE — PROGRESS NOTES
Dennis Diehl (: 1985) is a 39 y.o. male, Established patient, who presents today for:    Chief Complaint   Patient presents with    Nausea     B/l ear fullness feels like they are crackling, congestion, recent dx of flu A, HA, diarrhea, x3 days          Subjective     HPI:  HPI   Patient here to get Dulera inhaler refilled, 2 weeks ago had Influenza A and he has been coughing and had some shortness of breath with that illness,The cough persisted  Pt now with N/V/D, viral illness has been running through his home with others being ill as well with similar symptoms  Denies fever  Pt has been using Pepto for diarrhea without relief and Zofran for N/V  Not eating much, anything that he eats goes right through him        Review of Systems   Constitutional:  Negative for chills, fever and unexpected weight change.   HENT:  Positive for congestion. Negative for ear pain.    Eyes:  Negative for discharge and redness.   Respiratory:  Positive for cough, shortness of breath and wheezing. Negative for chest tightness.    Cardiovascular:  Negative for chest pain, palpitations and leg swelling.   Gastrointestinal:  Positive for diarrhea and nausea. Negative for abdominal pain, constipation and vomiting.   Musculoskeletal:  Negative for arthralgias and myalgias.   Skin:  Negative for color change and rash.   Allergic/Immunologic: Negative for environmental allergies.   Neurological:  Negative for dizziness, weakness, light-headedness, numbness and headaches.   Hematological:  Negative for adenopathy.   Psychiatric/Behavioral:  Negative for sleep disturbance.         Past Medical History:   Diagnosis Date    Family history of ischemic heart disease 2023    Gastroesophageal reflux disease without esophagitis 12/15/2022    Heart murmur     Hyperlipidemia, mixed 2023    Mild intermittent reactive airway disease 12/15/2022    Obesity 2023      Social History     Socioeconomic History    Marital status:

## 2025-08-01 DIAGNOSIS — E55.9 VITAMIN D DEFICIENCY: ICD-10-CM

## 2025-08-01 DIAGNOSIS — E78.2 HYPERLIPIDEMIA, MIXED: ICD-10-CM

## 2025-08-01 RX ORDER — ACETAMINOPHEN 160 MG
2000 TABLET,DISINTEGRATING ORAL DAILY
Qty: 90 CAPSULE | Refills: 0 | Status: SHIPPED | OUTPATIENT
Start: 2025-08-01

## 2025-08-01 RX ORDER — ATORVASTATIN CALCIUM 20 MG/1
20 TABLET, FILM COATED ORAL DAILY
Qty: 90 TABLET | Refills: 0 | Status: SHIPPED | OUTPATIENT
Start: 2025-08-01

## 2025-08-01 NOTE — TELEPHONE ENCOUNTER
Comments:     Last Office Visit (last PCP visit):   1/30/2025    Next Visit Date:  Future Appointments   Date Time Provider Department Center   8/6/2025  6:30 PM Steven Bahena MD [facility] DEP   8/27/2025  4:15 PM Bertha Wesley APRN - CNP [facility]       **If hasn't been seen in over a year OR hasn't followed up according to last diabetes/ADHD visit, make appointment for patient before sending refill to provider.    Rx requested:  Requested Prescriptions     Pending Prescriptions Disp Refills    atorvastatin (LIPITOR) 20 MG tablet 90 tablet 4     Sig: Take 1 tablet by mouth daily    vitamin D (VITAMIN D3) 50 MCG (2000 UT) CAPS capsule 90 capsule 1     Sig: Take by mouth daily

## 2025-08-02 ENCOUNTER — HOSPITAL ENCOUNTER (OUTPATIENT)
Dept: LAB | Age: 40
Discharge: HOME | End: 2025-08-02
Payer: COMMERCIAL

## 2025-08-02 DIAGNOSIS — Z13.0 SCREENING, IRON DEFICIENCY ANEMIA: ICD-10-CM

## 2025-08-02 DIAGNOSIS — E55.9 VITAMIN D DEFICIENCY: ICD-10-CM

## 2025-08-02 DIAGNOSIS — R73.03 PRE-DIABETES: ICD-10-CM

## 2025-08-02 DIAGNOSIS — E78.2 HYPERLIPIDEMIA, MIXED: ICD-10-CM

## 2025-08-02 LAB
ALBUMIN SERPL-MCNC: 4.5 G/DL (ref 3.5–4.6)
ALP SERPL-CCNC: 78 U/L (ref 35–104)
ALT SERPL-CCNC: 43 U/L (ref 0–41)
ANION GAP SERPL CALCULATED.3IONS-SCNC: 9 MEQ/L (ref 9–15)
AST SERPL-CCNC: 26 U/L (ref 0–40)
BASOPHILS # BLD: 0 K/UL (ref 0–0.2)
BASOPHILS NFR BLD: 0.6 %
BILIRUB SERPL-MCNC: 0.7 MG/DL (ref 0.2–0.7)
BUN SERPL-MCNC: 13 MG/DL (ref 6–20)
CALCIUM SERPL-MCNC: 9.1 MG/DL (ref 8.5–9.9)
CHLORIDE SERPL-SCNC: 99 MEQ/L (ref 95–107)
CHOLEST SERPL-MCNC: 199 MG/DL (ref 0–199)
CO2 SERPL-SCNC: 26 MEQ/L (ref 20–31)
CREAT SERPL-MCNC: 1 MG/DL (ref 0.7–1.2)
EOSINOPHIL # BLD: 0.1 K/UL (ref 0–0.7)
EOSINOPHIL NFR BLD: 1.1 %
ERYTHROCYTE [DISTWIDTH] IN BLOOD BY AUTOMATED COUNT: 12.1 % (ref 11.5–14.5)
GLOBULIN SER CALC-MCNC: 2.3 G/DL (ref 2.3–3.5)
GLUCOSE SERPL-MCNC: 132 MG/DL (ref 70–99)
HCT VFR BLD AUTO: 44.1 % (ref 42–52)
HDLC SERPL-MCNC: 38 MG/DL (ref 40–59)
HGB BLD-MCNC: 15.2 G/DL (ref 14–18)
LDLC SERPL CALC-MCNC: 112 MG/DL (ref 0–129)
LYMPHOCYTES # BLD: 1.5 K/UL (ref 1–4.8)
LYMPHOCYTES NFR BLD: 27.5 %
MCH RBC QN AUTO: 28.1 PG (ref 27–31.3)
MCHC RBC AUTO-ENTMCNC: 34.5 % (ref 33–37)
MCV RBC AUTO: 81.7 FL (ref 79–92.2)
MONOCYTES # BLD: 0.4 K/UL (ref 0.2–0.8)
MONOCYTES NFR BLD: 6.6 %
NEUTROPHILS # BLD: 3.4 K/UL (ref 1.4–6.5)
NEUTS SEG NFR BLD: 63.4 %
PLATELET # BLD AUTO: 211 K/UL (ref 130–400)
POTASSIUM SERPL-SCNC: 4.3 MEQ/L (ref 3.4–4.9)
PROT SERPL-MCNC: 6.8 G/DL (ref 6.3–8)
RBC # BLD AUTO: 5.4 M/UL (ref 4.7–6.1)
SODIUM SERPL-SCNC: 134 MEQ/L (ref 135–144)
TRIGL SERPL-MCNC: 244 MG/DL (ref 0–150)
WBC # BLD AUTO: 5.3 K/UL (ref 4.8–10.8)

## 2025-08-02 PROCEDURE — 82306 VITAMIN D 25 HYDROXY: CPT

## 2025-08-02 PROCEDURE — 85025 COMPLETE CBC W/AUTO DIFF WBC: CPT

## 2025-08-02 PROCEDURE — 80061 LIPID PANEL: CPT

## 2025-08-02 PROCEDURE — 36415 COLL VENOUS BLD VENIPUNCTURE: CPT

## 2025-08-02 PROCEDURE — 83036 HEMOGLOBIN GLYCOSYLATED A1C: CPT

## 2025-08-02 PROCEDURE — 80053 COMPREHEN METABOLIC PANEL: CPT

## 2025-08-03 LAB
ESTIMATED AVERAGE GLUCOSE: 148 MG/DL
HBA1C MFR BLD: 6.8 % (ref 4–6)
VITAMIN D 25-HYDROXY: 33.8 NG/ML (ref 30–100)

## 2025-08-04 SDOH — HEALTH STABILITY: PHYSICAL HEALTH: ON AVERAGE, HOW MANY MINUTES DO YOU ENGAGE IN EXERCISE AT THIS LEVEL?: 0 MIN

## 2025-08-04 SDOH — HEALTH STABILITY: PHYSICAL HEALTH: ON AVERAGE, HOW MANY DAYS PER WEEK DO YOU ENGAGE IN MODERATE TO STRENUOUS EXERCISE (LIKE A BRISK WALK)?: 0 DAYS

## 2025-08-06 ENCOUNTER — OFFICE VISIT (OUTPATIENT)
Dept: FAMILY MEDICINE CLINIC | Age: 40
End: 2025-08-06
Payer: COMMERCIAL

## 2025-08-06 VITALS
OXYGEN SATURATION: 98 % | HEIGHT: 66 IN | TEMPERATURE: 97.5 F | BODY MASS INDEX: 31.02 KG/M2 | SYSTOLIC BLOOD PRESSURE: 122 MMHG | WEIGHT: 193 LBS | DIASTOLIC BLOOD PRESSURE: 76 MMHG | HEART RATE: 78 BPM

## 2025-08-06 DIAGNOSIS — M54.50 LUMBAR PAIN: ICD-10-CM

## 2025-08-06 DIAGNOSIS — E66.811 CLASS 1 OBESITY DUE TO EXCESS CALORIES WITH SERIOUS COMORBIDITY AND BODY MASS INDEX (BMI) OF 31.0 TO 31.9 IN ADULT: ICD-10-CM

## 2025-08-06 DIAGNOSIS — R73.03 PRE-DIABETES: ICD-10-CM

## 2025-08-06 DIAGNOSIS — E78.2 HYPERLIPIDEMIA, MIXED: Primary | ICD-10-CM

## 2025-08-06 DIAGNOSIS — E66.09 CLASS 1 OBESITY DUE TO EXCESS CALORIES WITH SERIOUS COMORBIDITY AND BODY MASS INDEX (BMI) OF 31.0 TO 31.9 IN ADULT: ICD-10-CM

## 2025-08-06 DIAGNOSIS — Z51.81 ENCOUNTER FOR MEDICATION MONITORING: ICD-10-CM

## 2025-08-06 PROCEDURE — 99215 OFFICE O/P EST HI 40 MIN: CPT | Performed by: FAMILY MEDICINE

## 2025-08-06 RX ORDER — CYCLOBENZAPRINE HCL 5 MG
5 TABLET ORAL NIGHTLY PRN
Qty: 30 TABLET | Refills: 0 | Status: SHIPPED | OUTPATIENT
Start: 2025-08-06

## 2025-08-06 RX ORDER — PREDNISONE 10 MG/1
TABLET ORAL
Qty: 20 TABLET | Refills: 0 | Status: SHIPPED | OUTPATIENT
Start: 2025-08-06

## 2025-08-06 RX ORDER — EZETIMIBE 10 MG/1
10 TABLET ORAL DAILY
Qty: 90 TABLET | Refills: 1 | Status: SHIPPED | OUTPATIENT
Start: 2025-08-06